# Patient Record
Sex: MALE | Race: WHITE
[De-identification: names, ages, dates, MRNs, and addresses within clinical notes are randomized per-mention and may not be internally consistent; named-entity substitution may affect disease eponyms.]

---

## 2018-04-23 LAB
ALBUMIN SERPL-MCNC: 3.7 GM/DL (ref 3.4–5)
BASOPHILS # BLD: 0.03 K/UL (ref 0–0.2)
BASOPHILS NFR BLD: 0.4 %
BUN SERPL-MCNC: 20 MG/DL (ref 7–18)
CALCIUM SERPL-MCNC: 9.1 MG/DL (ref 8.5–10.1)
CO2 SERPL-SCNC: 29 MMOL/L (ref 21–32)
CREAT SERPL-MCNC: 0.98 MG/DL (ref 0.6–1.4)
EOS ABS #: 0.24 K/UL (ref 0–0.5)
EOSINOPHIL NFR BLD AUTO: 213 K/UL (ref 130–400)
GLUCOSE SERPL-MCNC: 152 MG/DL (ref 70–99)
HBA1C MFR BLD: 7 % (ref 4.5–5.6)
HCT VFR BLD CALC: 45.1 % (ref 42–52)
HGB BLD-MCNC: 14.9 G/DL (ref 14–18)
IG#: 0.02 K/UL (ref 0–0.02)
IMM GRANULOCYTES NFR BLD AUTO: 28.3 %
INR PPP: 1 (ref 0.9–1.1)
LYMPHOCYTES # BLD: 2.32 K/UL (ref 1.2–3.4)
MCH RBC QN AUTO: 29.2 PG (ref 25–34)
MCHC RBC AUTO-ENTMCNC: 33 G/DL (ref 32–36)
MCV RBC AUTO: 88.3 FL (ref 80–100)
MONO ABS #: 0.44 K/UL (ref 0.11–0.59)
MONOCYTES NFR BLD: 5.4 %
NEUT ABS #: 5.16 K/UL (ref 1.4–6.5)
NEUTROPHILS # BLD AUTO: 2.9 %
NEUTROPHILS NFR BLD AUTO: 62.8 %
PMV BLD AUTO: 9.9 FL (ref 7.4–10.4)
POTASSIUM SERPL-SCNC: 4.5 MMOL/L (ref 3.5–5.1)
PTT PATIENT: 25.7 SECONDS (ref 21–31)
RED CELL DISTRIBUTION WIDTH CV: 13.7 % (ref 11.5–14.5)
RED CELL DISTRIBUTION WIDTH SD: 44.4 FL (ref 36.4–46.3)
SODIUM SERPL-SCNC: 135 MMOL/L (ref 136–145)
WBC # BLD AUTO: 8.21 K/UL (ref 4.8–10.8)

## 2018-04-23 NOTE — DIAGNOSTIC IMAGING REPORT
CHEST 2 VIEWS ROUTINE



CLINICAL HISTORY: Preoperative chest    



COMPARISON STUDY:  No previous studies for comparison.



FINDINGS: The heart is enlarged. There is no failure. There is no focal

pulmonary consolidation. There are no pleural effusions.[ 



IMPRESSION: Cardiomegaly. No acute findings.







Electronically signed by:  Sunil Gaspar M.D.

4/23/2018 10:21 AM



Dictated Date/Time:  4/23/2018 10:20 AM

## 2018-05-01 NOTE — HISTORY & PHYSICAL EXAMINATION
DATE OF ADMISSION:  05/02/2018

 

CHIEF COMPLAINT:  Chronic right knee pain.

 

HISTORY OF PRESENT ILLNESS:  This is a 47-year-old male patient of Dr. Bethea's complaining of chronic right knee pain, longstanding, now

progressively getting worse.  Patient failed conservative treatment including

anti-inflammatories, intraarticular injections and the use of a sleeve. 

Patient has increased pain with weightbearing activities and his pain does

interfere with his activities of daily living.  Patient has been diagnosed

with end-stage osteoarthritis per clinical and radiographic exams.

 

PAST MEDICAL HISTORY:  Snoring, carpal tunnel, diabetes, rheumatoid

arthritis, osteoarthritis, spine problems, acid reflux, obesity, dentures.

 

SOCIAL HISTORY:  Nonsmoker, occasional drinker.

 

PAST SURGICAL HISTORY:  Eye surgery.

 

FAMILY HISTORY:  Noncontributory.

 

REVIEW OF SYSTEMS:  Patient complains of chronic right knee pain, otherwise

denies any shortness of breath, chest pain, nausea, vomiting or any other

joint complaints.

 

MEDICATIONS:  Include:

1.  Prilosec 40 mg daily.

2.  Glucosamine chondroitin daily.

3.  Metformin 500 mg b.i.d.

4.  Diclofenac sodium 75 mg b.i.d.

 

ALLERGIES:  No known drug allergies.

 

PHYSICAL EXAMINATION:

GENERAL:  Well-developed, well-nourished 47-year-old male in no acute

distress.  He is alert and oriented x3 and pleasant.

HEENT:  Normocephalic, atraumatic.  Extraocular motions are intact.  Pupils

are equal and reactive to light.

HEART:  Regular rate and rhythm, no murmurs.

LUNGS:  Clear.

ABDOMEN:  Soft and nontender.  Bowel sounds are present.

EXTREMITIES:  Right knee reveals 0-135 degrees of motion with a varus

deformity.  He has medial joint line tenderness with mild effusion.  He has

5/5 strength.

NEUROLOGIC:  Neurovascularly, he is intact in his right lower extremity.

 

DIAGNOSES:  Right knee end-stage osteoarthritis, snoring, diabetes mellitus,

rheumatoid arthritis, osteoarthritis, spine problems, acid reflux, obesity,

dentures.

 

PLAN:  Patient was advised of his diagnosis.  Indications, risks, benefits,

postop course have been reviewed.  Patient wished to proceed with a right

total knee arthroplasty.  Necessary consent forms, preoperative testing and

clearances will be obtained.

## 2018-05-02 ENCOUNTER — HOSPITAL ENCOUNTER (INPATIENT)
Dept: HOSPITAL 45 - C.ACU | Age: 48
LOS: 2 days | Discharge: HOME | DRG: 470 | End: 2018-05-04
Attending: ORTHOPAEDIC SURGERY | Admitting: ORTHOPAEDIC SURGERY
Payer: COMMERCIAL

## 2018-05-02 VITALS
TEMPERATURE: 97.7 F | SYSTOLIC BLOOD PRESSURE: 129 MMHG | OXYGEN SATURATION: 96 % | HEART RATE: 88 BPM | DIASTOLIC BLOOD PRESSURE: 86 MMHG

## 2018-05-02 VITALS
DIASTOLIC BLOOD PRESSURE: 80 MMHG | OXYGEN SATURATION: 97 % | SYSTOLIC BLOOD PRESSURE: 136 MMHG | TEMPERATURE: 98.42 F | HEART RATE: 79 BPM

## 2018-05-02 VITALS
SYSTOLIC BLOOD PRESSURE: 139 MMHG | TEMPERATURE: 98.24 F | HEART RATE: 87 BPM | OXYGEN SATURATION: 96 % | DIASTOLIC BLOOD PRESSURE: 78 MMHG

## 2018-05-02 VITALS
SYSTOLIC BLOOD PRESSURE: 157 MMHG | TEMPERATURE: 97.88 F | OXYGEN SATURATION: 97 % | DIASTOLIC BLOOD PRESSURE: 92 MMHG | HEART RATE: 77 BPM

## 2018-05-02 VITALS
HEART RATE: 79 BPM | TEMPERATURE: 98.24 F | DIASTOLIC BLOOD PRESSURE: 93 MMHG | OXYGEN SATURATION: 98 % | SYSTOLIC BLOOD PRESSURE: 157 MMHG

## 2018-05-02 VITALS
TEMPERATURE: 98.24 F | SYSTOLIC BLOOD PRESSURE: 166 MMHG | HEART RATE: 79 BPM | OXYGEN SATURATION: 96 % | DIASTOLIC BLOOD PRESSURE: 96 MMHG

## 2018-05-02 VITALS
HEIGHT: 71 IN | WEIGHT: 315 LBS | BODY MASS INDEX: 44.1 KG/M2 | HEIGHT: 71 IN | WEIGHT: 315 LBS | BODY MASS INDEX: 44.1 KG/M2

## 2018-05-02 DIAGNOSIS — K21.9: ICD-10-CM

## 2018-05-02 DIAGNOSIS — M17.11: Primary | ICD-10-CM

## 2018-05-02 DIAGNOSIS — Z79.84: ICD-10-CM

## 2018-05-02 DIAGNOSIS — Z79.899: ICD-10-CM

## 2018-05-02 DIAGNOSIS — E11.9: ICD-10-CM

## 2018-05-02 DIAGNOSIS — E66.01: ICD-10-CM

## 2018-05-02 PROCEDURE — 0SRC0J9 REPLACEMENT OF RIGHT KNEE JOINT WITH SYNTHETIC SUBSTITUTE, CEMENTED, OPEN APPROACH: ICD-10-PCS | Performed by: ORTHOPAEDIC SURGERY

## 2018-05-02 RX ADMIN — INSULIN ASPART SCH UNITS: 100 INJECTION, SOLUTION INTRAVENOUS; SUBCUTANEOUS at 21:41

## 2018-05-02 RX ADMIN — ACETAMINOPHEN SCH MG: 500 TABLET, COATED ORAL at 21:35

## 2018-05-02 RX ADMIN — TRANEXAMIC ACID SCH MLS/HR: 100 INJECTION, SOLUTION INTRAVENOUS at 06:30

## 2018-05-02 RX ADMIN — CEFAZOLIN SCH MLS/MIN: 10 INJECTION, POWDER, FOR SOLUTION INTRAVENOUS at 21:38

## 2018-05-02 RX ADMIN — INSULIN ASPART SCH UNITS: 100 INJECTION, SOLUTION INTRAVENOUS; SUBCUTANEOUS at 18:39

## 2018-05-02 RX ADMIN — INSULIN GLARGINE SCH UNITS: 100 INJECTION, SOLUTION SUBCUTANEOUS at 21:40

## 2018-05-02 RX ADMIN — OXYCODONE HYDROCHLORIDE PRN MG: 5 TABLET ORAL at 20:20

## 2018-05-02 RX ADMIN — TRANEXAMIC ACID SCH MLS/HR: 100 INJECTION, SOLUTION INTRAVENOUS at 13:07

## 2018-05-02 RX ADMIN — CELECOXIB SCH MG: 200 CAPSULE ORAL at 21:36

## 2018-05-02 RX ADMIN — SODIUM CHLORIDE SCH MLS/HR: 900 INJECTION, SOLUTION INTRAVENOUS at 17:30

## 2018-05-02 RX ADMIN — DOCUSATE SODIUM SCH MG: 100 CAPSULE, LIQUID FILLED ORAL at 21:35

## 2018-05-02 NOTE — MEDICAL CONSULT
Consultation


Date of Consultation:


May 2, 2018.


Attending Physician:


Roosevelt Bethea M.D.


Reason for Consultation:


Medical management


History of Present Illness


Pt is 48 y/o M with PMH DM II, GERD seen in medical management after R TKA 

today by Dr Bethea. Pt states pain is well controlled. He still has some 

numbness sensation to R lower leg/foot. Pt states has not urinated or had BM 

yet since surgery. Eating and drinking well. Denies fever/chills, diaphoresis, N

/V/D/C, HA, dizziness, syncope, vision changes, neck pain, CP, SOB, orthopnea, 

palpitations, cough, sore throat, choking, otalgia, rhinorrhea, abdominal pain, 

rashes, urinary symptoms, weight loss.





Past Medical/Surgical History


Medical Problems:


(1) Dislocation of right shoulder joint


Status: Resolved  





(2) DM type 2 (diabetes mellitus, type 2)


Status: Chronic  





(3) GERD (gastroesophageal reflux disease)


Status: Chronic  





(4) Right knee DJD


Status: Chronic  





(5) Right shoulder strain


Status: Resolved  





Surgical Problems:


(1) H/O eye surgery


Permanent Comment: L eye s/p eye injury


Status: Resolved  





(2) History of repair of anterior cruciate ligament of right knee


Status: Resolved  








Family History





Cancer


Diabetes mellitus


Hypertension





Social History


Smoking Status:  Never Smoker


Smokeless Tobacco Use:  No


Alcohol Use:  socially


Drug Use:  none


Marital Status:  


Housing Status:  lives with family


Occupation Status:  employed





Allergies


Coded Allergies:  


     No Known Allergies (Unverified , 5/2/18)





Home Medications





Reported Home Medications








 Medications  Dose


 Route/Sig


 Max Daily Dose Days Date Category Dose


Instructions


 


 Flexeril


  (Cyclobenzaprine


 Hcl) 10 Mg Tab  10 Mg


 PO TID PRN


    4/23/18 Reported 


 


 Glucosamine


 Chondroitin


  (Glucosamine-Chondroitin-Vit


 C-) Unknown


 Strength Cap  Unknown Dose


 PO BID


    4/23/18 Reported  PT RECEIVED INSTRUCTIONS FROM SURGEON OFFICE TO STOP 1 

WEEK PRIOR TO


 SURGERY - LAST DOSE WAS AM OF 04/22/18


 


 Voltaren


  (Diclofenac


 Sodium) 75 Mg


 Tabcr  75 Mg


 PO BID


    4/23/18 Reported  PT RECEIVED INSTRUCTIONS FROM SURGEON OFFICE TO STOP 1 

WEEK PRIOR TO


 SURGERY - LAST DOSE 04/22/18


 


 Tylenol


  (Acetaminophen)


 500 Mg Tab  2 Tab


 PO UD PRN


    4/23/18 Reported 


 


 Glucophage


  (Metformin Hcl)


 500 Mg Tab  500 Mg


 PO BID


    10/22/16 Reported 


 


 Motrin


  (Ibuprofen) 800


 Mg Tab  800 Mg


 PO Q6HR PRN PRN


    11/15/12 Reported  PT RECEIVED INSTRUCTIONS FROM SURGEON OFFICE TO STOP PRE 

OP 1 WEEK


 PRIOR TO SURGERY - PT


 REPORTS HE HAS STOPPED THIS...LAST DOSE OF THIS MED WAS 04/22/18


 


 Prilosec


  (Omeprazole) 40


 Mg Cap  40 Mg


 PO QAM


    11/15/12 Reported  take 1 hour prior first meal of the day











Current Inpatient Medications





Current Inpatient Medications








 Medications


  (Trade)  Dose


 Ordered  Sig/Nael


 Route  Start Time


 Stop Time Status Last Admin


Dose Admin


 


 Lactated Ringer's  1,000 ml @ 


 15 mls/hr  Q24H


 IV  5/2/18 06:00


 5/3/18 05:59   


 


 


 Cyclobenzaprine


 HCl


  (Flexeril Tab)  10 mg  TID  PRN


 PO  5/2/18 16:15


 6/1/18 16:14   


 


 


 Sodium Chloride  1,000 ml @ 


 100 mls/hr  Q10H


 IV  5/2/18 17:30


 5/3/18 17:29   


 


 


 Cefazolin Sodium


 3000 mg/Syringe  22.5 ml @ 


 4.5 mls/min  Q8H


 IV  5/2/18 21:00


 5/3/18 05:04   


 


 


 Celecoxib


  (CeleBREX CAP)  200 mg  BID


 PO  5/2/18 21:00


 6/1/18 20:59   


 


 


 Oxycodone HCl


  (Roxicodone


 Immediate Rel Tab)  1 TABLET


 FOR PAIN


 RATING...  Q4H  PRN


 PO  5/2/18 16:15


 5/16/18 16:14   


 


 


 Morphine Sulfate


  (MoRPHine


 SULFATE INJ)  FOR PAIN,


 2-4MG 2MG


 FOR P...  Q2H  PRN


 IV  5/2/18 16:15


 5/16/18 16:14   


 


 


 Acetaminophen


  (Tylenol Tab)  1,000 mg  Q8H


 PO  5/2/18 22:00


 6/1/18 21:59   


 


 


 Magnesium


 Hydroxide


  (Milk Of


 Magnesia Susp)  30 ml  Q6H  PRN


 PO  5/2/18 16:15


 6/1/18 16:14   


 


 


 Bisacodyl


  (Dulcolax Supp)  10 mg  DAILY  PRN


 OK  5/2/18 16:15


 6/1/18 16:14   


 


 


 Sodium


 Biphosphate/


 Sodium Phosphate


  (Fleet Enema)  132 ml  DAILY  PRN


 OK  5/2/18 16:15


 6/1/18 16:14   


 


 


 Docusate Sodium


  (coLACE CAP)  100 mg  BID


 PO  5/2/18 21:00


 6/1/18 20:59   


 


 


 Diphenhydramine


 HCl


  (Benadryl Cap)  25 mg  Q8H  PRN


 PO  5/2/18 16:15


 6/1/18 16:14   


 


 


 Zolpidem Tartrate


  (Ambien Tab)  5 mg  HSZ  PRN


 PO  5/2/18 16:15


 6/1/18 16:14   


 


 


 Multivitamins


  (Multivitamin


 Tab)  1 tab  QAM


 PO  5/3/18 09:00


 6/2/18 08:59   


 


 


 Ondansetron HCl


  (Zofran Inj)  4 mg  Q6H  PRN


 IV  5/2/18 16:15


 6/1/18 16:14   


 


 


 Metoclopramide HCl


  (Reglan Inj)  10 mg  Q6H  PRN


 IV  5/2/18 16:15


 6/1/18 16:14   


 


 


 Pantoprazole


 Sodium


  (Protonix Tab)  40 mg  QAM


 PO  5/3/18 09:00


 5/7/18 08:59   


 


 


 Tramadol HCl


  (Ultram Tab)  1 tablet


 for pain


 rating...  Q4H  PRN


 PO  5/2/18 16:15


 6/1/18 16:14   


 


 


 Rivaroxaban


  (Xarelto Tab)  10 mg  Q24H


 PO  5/3/18 06:00


 5/14/18 06:01   


 


 


 Insulin Aspart


  (novoLOG ASPART)  **SLIDING


 SCALE**


 **G...  ACHS


 SC  5/2/18 21:00


 6/1/18 20:59  5/2/18 18:39


6 UNITS


 


 Insulin Glargine


  (Lantus Solostar


 Pen)  12 units  Q12


 SC  5/2/18 21:00


 6/1/18 20:59 UNV  


 


 


 Insulin Aspart


  (novoLOG ASPART)  **SLIDING


 SCALE**


 **If C...  ACHS


 SC  5/2/18 21:00


 6/1/18 20:59 UNV  


 


 


 Glucose


  (Glucose 40% Gel)  15-30


 GRAMS 15


 GRAMS...  UD  PRN


 PO  5/2/18 18:30


 6/1/18 18:29 UNV  


 


 


 Glucose


  (Glucose Chew


 Tab)  4-8


 Tablets 4


 Tabl...  UD  PRN


 PO  5/2/18 18:30


 6/1/18 18:29 UNV  


 


 


 Dextrose


  (Dextrose 50%


 50ML Syringe)  25-50ML


 25ML FOR


 ...  UD  PRN


 IV  5/2/18 18:30


 6/1/18 18:29 UNV  


 


 


 Glucagon


  (Glucagon Inj)  1 mg  UD  PRN


 SQ  5/2/18 18:30


 6/1/18 18:29 UNV  


 


 


 Carbohydrates


  (Carbohydrates


 For Hypoglycemia)  15-30 GRAMS


 15 grams if


 BSG 54-69...  UD  PRN


 PO  5/2/18 18:30


 6/1/18 18:29 UNV  


 











Review of Systems


See HPI for pertinent positives & negatives. All other systems reviewed and 

were otherwise negative





Physical Exam











  Date Time  Temp Pulse Resp B/P (MAP) Pulse Ox O2 Delivery O2 Flow Rate FiO2


 


5/2/18 17:54 36.6 77 16 157/92 (113) 97 Nasal Cannula 2.0 


 


5/2/18 17:13      Nasal Cannula 4.0 


 


5/2/18 16:50 36.9 79 16 136/80 (98) 97 Nasal Cannula 2.0 


 


5/2/18 16:35 36.6 78 16 135/72 95 Nasal Cannula 2 


 


5/2/18 16:25  78 16 137/82 95 Oxymask 10 


 


5/2/18 16:15  70 16 146/76 94 Oxymask 10 


 


5/2/18 16:09 36.0 76 16 134/71 96 Oxymask 10 


 


5/2/18 10:04 36.8 79 19 166/96 96 Room Air  








General Appearance:  no apparent distress, + obese


Head:  normocephalic, atraumatic


Eyes:  sclerae normal


ENT:  hearing grossly normal, pharynx normal, + pertinent finding (mucous 

membranes moist)


Neck:  supple, trachea midline


Respiratory/Chest:  lungs clear, normal breath sounds, no respiratory distress


Cardiovascular:  regular rate, rhythm, no murmur, normal peripheral pulses


Abdomen/GI:  normal bowel sounds, non tender, soft


Extremities/Musculoskelatal:  + pertinent finding (R knee with surgical 

dressing in place. Distal pulses intact bilaterally. sensation to light touch 

intact, brisk capillary refill intact bilaterally. pedal pushes and pulls 

intact bilaterally.)


Neurologic/Psych:  alert, normal mood/affect, oriented x 3


Skin:  normal color, warm/dry





Laboratory Results





Last 24 Hours








Test


  5/2/18


09:41 5/2/18


16:17 5/2/18


17:05


 


Bedside Glucose 156 mg/dl  142 mg/dl  167 mg/dl 











Assessment & Plan


Pt post op day# 0 S/P R TKA by Dr Bethea


-pain management per ortho


-wound management per ortho


-PT/OT as appropriate 


-DVT prophylaxis per ortho


-incentive spirometry


-monitor H&H for acute blood loss anemia





DM II


HA1C: 7.0 on 4/23/28


-hold metformin


-Lantus, Novolog sliding scale per protocol





GERD


-continue PPI





DVT Prophylaxis


-per ortho





Disposition


admitted medsur


Full Code


Follows with Dr Dimitry Conteh for routine care





Pt was seen with Dr Coley. See addendum





ATTENDING ADDENDUM:





Patient seen and examined care coordinated with Samaria Mac PA-C





This is a 47-year-old male who underwent R knee total knee arthroplasty


Recovering well postop,


Has minimal pain


Able to ambulate independently


No complaint of shortness of breath, chest heaviness





Continue management as per orthopedics


Ordered for CBC in a.m. to assess for acute blood loss anemia





Trish Coley MD





Additional Copies To


Aj Moraes D.O.

## 2018-05-02 NOTE — [facility] OPERATIVE REPORT
Operative Report


Operative Date


May 2, 2018.





Pre-Operative Diagnosis





Right Knee End-Stage Osteoarthritis,h/o acl reconstruction morbid obesity


BMI 48.6





Post-Operative Diagnosis


Same failed ACL reconstruction





Procedure(s) Performed


Right total knee arthroplasty with lateral release





Surgeon


Dr. Roosevelt Bethea





Assistant Surgeon(s)


Darrin Yen PA-C





Estimated Blood Loss


15 ml





Findings


Severe DJD failed ACL reconstruction increased posterior slope tibia with 

posterior medial bone loss on tibia





Specimens





a. right knee bone and tissue





Drains


2 hemovac





Anesthesia


Spinal sedation regional block orthomix





Complication(s)


None





Disposition


Recovery Room / PACU





Indications


47-year-old male history of previous ACL reconstruction his right knee severe 

bilateral knee DJD.  Had extensive conservative management.  Radiographs 

demonstrate bone-on-bone medial compartment bilateral knees with varus knees 

increased posterior slope tibia with posterior medial bone loss tibia





Description of Procedure


Patient taken to the operating room the size under IV sedation spinal 

anesthetic adductor canal nerve block anesthesia.  Patient was placed supine on 

the operating table.  A pneumatic tourniquet was placed about the right upper 

thigh.  The right lower extremity was prepped and draped in sterile fashion.  

Knee exam demonstrated obese leg obese thigh flexion contracture of 15 with 

flexion to 115.  The leg was elevated exsanguinated with an Esmarch bandage 

and pneumatic tourniquet was raised to 350 millimeters of mercury.  Skin 

incised sharply in longitudinal fashion.  Subcutaneous flaps elevated.  

Incision was made through the medial retinaculum extending up in the mid third 

of the quadriceps tendon and down to the medial tibial tubercle.  Intra-

articular findings demonstrated severe osteoarthritis of the knee bone-on-bone 

medial compartment posterior medial bone loss advanced patellofemoral DJD 

tricompartmental osteophytes absent ACL graft degeneration PCL.  The [employer] 

[employer] primary total knee arthroplasty system was used.  To expose the 

knee the infrapatellar fat pad was resected.  The meniscal remnants and 

cruciate ligaments were resected.  The anterior fat pad over the femur in the 

area of the anterior flange of the femoral component was resected.  Lateral 

synovial bands release.  The femur was exposed.  An intramedullary drill hole 

was made into the canal.  A guidewire was placed.  Distal femoral cutting guide 

was adjusted to resect a 5 degree valgus cut with 2 more millimeters of distal 

femur than the primary cut resected.  The knee was extended and a subperiosteal 

peel lateral release was performed around the patella.  Patella width was 

measured and width was reproduced using a freehand cut technique and a 38 

patella component.  The 3 drill holes were made and the excess lateral facet 

was beveled off to prevent any impingement.  Attention was taken back to the 

femur which was exposed with retractors and the femoral sizing guide was pinned 

in position.  The drill holes were placed in 3 of external rotation to match 

epicondylar axis.  Femur sized for a 7 component.  The 4-in-1 cutting block was 

placed and then the anterior posterior and chamfer cuts are made.  The tibia 

was then subluxed.  The external tibial cutting guide was just to make a 

perpendicular cut to the long axis of the tibia below the most deficient bone 

loss side.  A lamina  was used and the flexion extension gaps were 

balanced.  All posterior osteophytes removed.  All meniscal remnants were 

resected.  The tibia exposed and the trial tibial component size 6 was 

externally rotated in line with the tibial tubercle and pinned in position.  I 

first drilled a hole for the central stem due to possibility of the screw from 

previous ACL reconstruction being in the way and the screw was either 

bioabsorbable or peak so we went ahead with the drill and had no difficulty 

drilling for the stem.  Then the punch for stem was used.  The 7 femoral trial 

was centered appropriately and the femoral notch cut was made.  The collet was 

placed.  Trial tibial inserts were placed and size 11 high flex gave balanced 

ligaments through flexion and extension.  Patella tracking was assessed.  The 

patella tracked slightly laterally with slight liftoff.  A lateral release was 

performed allowing the synovium to be maintained intact.  I reassessed 

stability of the patella and the patella tracked centrally.  The trial 

components were then removed and the orthomix anesthetic cocktail was injected 

per protocol.  The knee was then copiously irrigated with pulsatile lavage 

antibiotic solution.  Final components were then cemented with Simplex cement.  

Final components were [employer] 7 femoral component posterior 

stabilized right, primary tibial baseplate, posterior stabilized left 

millimeter high flex polyethylene.  While the cement cured the Betadine soak 

was used per protocol.  After cement cured further pulsatile lavage irrigation 

performed and 2 Hemovac drains were brought out laterally.  The quadriceps 

tendon and medial retinaculum were closed with figure of 8 #1 Vicryl sutures.  

The knee was taken through full range of motion and the repair was secure.  The 

subcutaneous tissues were closed with 2-0 Vicryl sutures.  Skin was closed with 

staples.  Sterile dressings were applied.  Patient procedure well.  Darrin CRAFT was my physician assistant who assisted in patient positioning prepping and 

draping,leg positioning ,soft tissue retraction and instrument management and 

participated in the closing and will participate in postoperative care of the 

patient.  There was an increased difficulty throughout the procedure due to 

size the patient the leg and morbid obesity BMI 48.6.  The patient tolerated 

the procedure well.


I attest to the content of the Intraoperative Record and any orders documented 

therein.  Any exceptions are noted below.

## 2018-05-02 NOTE — DIAGNOSTIC IMAGING REPORT
R KNEE 1 OR 2 VIEWS ROUTINE



HISTORY:  47 years-old Male AP/LATERAL IN PACU RIGHT KNEE right knee total joint

arthroplasty. Degenerative joint disease.



COMPARISON: None available



TECHNIQUE: 2 views of the right knee



FINDINGS: 

Postoperative changes from recent right knee total joint arthroplasty with

patellar resurfacing. Surgical drain is in place along with anterior skin

staples and expected postsurgical soft tissue swelling with deep tissue air.

Evidence of suggested prior ACL repair. No malalignment, periprosthetic fracture

or retained foreign body identified.



IMPRESSION: Right knee arthroplasty and patella resurfacing without complication

identified. 







The above report was generated using voice recognition software. It may contain

grammatical, syntax or spelling errors.







Electronically signed by:  Sav Monaco M.D.

5/2/2018 4:53 PM



Dictated Date/Time:  5/2/2018 4:52 PM

## 2018-05-02 NOTE — [facility] POST OPERATIVE BRIEF NOTE
Immediate Operative Summary


Operative Date


May 2, 2018.





Pre-Operative Diagnosis





Right Knee End-Stage Osteoarthritis,h/o acl reconstruction





Post-Operative Diagnosis





Right Knee End-Stage Osteoarthritis





Procedure(s) Performed





Right Total Knee Arthroplasty,lateral release,increased difficulty BMI 48.6





Surgeon


Dr. Roosevelt Bethea





Assistant Surgeon(s)


Darrin Yen PA-C





Estimated Blood Loss


15 ml





Findings


Consistent with Post-Op Diagnosis





Specimens





a. right knee bone and tissue





Drains


2 hemovac





Anesthesia Type


MAC Spinal Regional





Complication(s)


none





Disposition


Disposition:  Recovery Room / PACU

## 2018-05-02 NOTE — ANESTHESIOLOGY PROGRESS NOTE
Anesthesia Post Op Note


Date & Time


May 2, 2018 at 16:30





Vital Signs


Pain Intensity:  0





Vital Signs Past 12 Hours








  Date Time  Temp Pulse Resp B/P (MAP) Pulse Ox O2 Delivery O2 Flow Rate FiO2


 


5/2/18 16:25  78 16 137/82 95 Oxymask 10 


 


5/2/18 16:15  70 16 146/76 94 Oxymask 10 


 


5/2/18 16:09 36.0 76 16 134/71 96 Oxymask 10 


 


5/2/18 10:04 36.8 79 19 166/96 96 Room Air  











Notes


Mental Status:  alert / awake / arousable, participated in evaluation


Pt Amnestic to Procedure:  Yes


Nausea / Vomiting:  adequately controlled


Pain:  adequately controlled


Airway Patency, RR, SpO2:  stable & adequate


BP & HR:  stable & adequate


Hydration State:  stable & adequate


Anesthetic Complications:  no major complications apparent

## 2018-05-03 VITALS
TEMPERATURE: 98.6 F | OXYGEN SATURATION: 95 % | DIASTOLIC BLOOD PRESSURE: 88 MMHG | HEART RATE: 77 BPM | SYSTOLIC BLOOD PRESSURE: 138 MMHG

## 2018-05-03 VITALS
HEART RATE: 74 BPM | SYSTOLIC BLOOD PRESSURE: 125 MMHG | OXYGEN SATURATION: 94 % | DIASTOLIC BLOOD PRESSURE: 77 MMHG | TEMPERATURE: 97.7 F

## 2018-05-03 VITALS
SYSTOLIC BLOOD PRESSURE: 148 MMHG | HEART RATE: 73 BPM | DIASTOLIC BLOOD PRESSURE: 78 MMHG | OXYGEN SATURATION: 94 % | TEMPERATURE: 98.42 F

## 2018-05-03 VITALS — OXYGEN SATURATION: 94 %

## 2018-05-03 VITALS
DIASTOLIC BLOOD PRESSURE: 88 MMHG | TEMPERATURE: 98.06 F | HEART RATE: 85 BPM | SYSTOLIC BLOOD PRESSURE: 149 MMHG | OXYGEN SATURATION: 96 %

## 2018-05-03 VITALS
SYSTOLIC BLOOD PRESSURE: 147 MMHG | HEART RATE: 74 BPM | DIASTOLIC BLOOD PRESSURE: 80 MMHG | TEMPERATURE: 98.06 F | OXYGEN SATURATION: 95 %

## 2018-05-03 LAB
BUN SERPL-MCNC: 17 MG/DL (ref 7–18)
CALCIUM SERPL-MCNC: 8.3 MG/DL (ref 8.5–10.1)
CO2 SERPL-SCNC: 26 MMOL/L (ref 21–32)
CREAT SERPL-MCNC: 0.97 MG/DL (ref 0.6–1.4)
EOSINOPHIL NFR BLD AUTO: 222 K/UL (ref 130–400)
GLUCOSE SERPL-MCNC: 132 MG/DL (ref 70–99)
HCT VFR BLD CALC: 39.6 % (ref 42–52)
HGB BLD-MCNC: 13.3 G/DL (ref 14–18)
MCH RBC QN AUTO: 29.2 PG (ref 25–34)
MCHC RBC AUTO-ENTMCNC: 33.6 G/DL (ref 32–36)
MCV RBC AUTO: 87 FL (ref 80–100)
PMV BLD AUTO: 10.2 FL (ref 7.4–10.4)
POTASSIUM SERPL-SCNC: 4.1 MMOL/L (ref 3.5–5.1)
RED CELL DISTRIBUTION WIDTH CV: 13.4 % (ref 11.5–14.5)
RED CELL DISTRIBUTION WIDTH SD: 42.7 FL (ref 36.4–46.3)
SODIUM SERPL-SCNC: 134 MMOL/L (ref 136–145)
WBC # BLD AUTO: 13.28 K/UL (ref 4.8–10.8)

## 2018-05-03 RX ADMIN — INSULIN ASPART SCH UNITS: 100 INJECTION, SOLUTION INTRAVENOUS; SUBCUTANEOUS at 18:12

## 2018-05-03 RX ADMIN — DOCUSATE SODIUM SCH MG: 100 CAPSULE, LIQUID FILLED ORAL at 08:47

## 2018-05-03 RX ADMIN — OXYCODONE HYDROCHLORIDE PRN MG: 5 TABLET ORAL at 19:55

## 2018-05-03 RX ADMIN — OXYCODONE HYDROCHLORIDE PRN MG: 5 TABLET ORAL at 00:32

## 2018-05-03 RX ADMIN — OXYCODONE HYDROCHLORIDE PRN MG: 5 TABLET ORAL at 05:14

## 2018-05-03 RX ADMIN — OXYCODONE HYDROCHLORIDE PRN MG: 5 TABLET ORAL at 14:49

## 2018-05-03 RX ADMIN — TRAMADOL HYDROCHLORIDE PRN MG: 50 TABLET, COATED ORAL at 23:17

## 2018-05-03 RX ADMIN — CELECOXIB SCH MG: 200 CAPSULE ORAL at 21:00

## 2018-05-03 RX ADMIN — ACETAMINOPHEN SCH MG: 500 TABLET, COATED ORAL at 05:15

## 2018-05-03 RX ADMIN — MORPHINE SULFATE PRN MG: 4 INJECTION, SOLUTION INTRAMUSCULAR; INTRAVENOUS at 06:24

## 2018-05-03 RX ADMIN — OXYCODONE HYDROCHLORIDE PRN MG: 5 TABLET ORAL at 10:23

## 2018-05-03 RX ADMIN — ACETAMINOPHEN SCH MG: 500 TABLET, COATED ORAL at 22:25

## 2018-05-03 RX ADMIN — INSULIN ASPART SCH UNITS: 100 INJECTION, SOLUTION INTRAVENOUS; SUBCUTANEOUS at 13:12

## 2018-05-03 RX ADMIN — DOCUSATE SODIUM SCH MG: 100 CAPSULE, LIQUID FILLED ORAL at 21:00

## 2018-05-03 RX ADMIN — INSULIN ASPART SCH UNITS: 100 INJECTION, SOLUTION INTRAVENOUS; SUBCUTANEOUS at 08:52

## 2018-05-03 RX ADMIN — ACETAMINOPHEN SCH MG: 500 TABLET, COATED ORAL at 13:14

## 2018-05-03 RX ADMIN — INSULIN ASPART SCH UNITS: 100 INJECTION, SOLUTION INTRAVENOUS; SUBCUTANEOUS at 21:03

## 2018-05-03 RX ADMIN — INSULIN GLARGINE SCH UNITS: 100 INJECTION, SOLUTION SUBCUTANEOUS at 08:53

## 2018-05-03 RX ADMIN — CELECOXIB SCH MG: 200 CAPSULE ORAL at 08:47

## 2018-05-03 RX ADMIN — RIVAROXABAN SCH MG: 10 TABLET, FILM COATED ORAL at 05:14

## 2018-05-03 RX ADMIN — SODIUM CHLORIDE SCH MLS/HR: 900 INJECTION, SOLUTION INTRAVENOUS at 00:34

## 2018-05-03 RX ADMIN — Medication SCH TAB: at 08:47

## 2018-05-03 RX ADMIN — INSULIN GLARGINE SCH UNITS: 100 INJECTION, SOLUTION SUBCUTANEOUS at 21:01

## 2018-05-03 RX ADMIN — MORPHINE SULFATE PRN MG: 4 INJECTION, SOLUTION INTRAMUSCULAR; INTRAVENOUS at 17:20

## 2018-05-03 RX ADMIN — CEFAZOLIN SCH MLS/MIN: 10 INJECTION, POWDER, FOR SOLUTION INTRAVENOUS at 05:14

## 2018-05-03 RX ADMIN — SODIUM CHLORIDE SCH MLS/HR: 900 INJECTION, SOLUTION INTRAVENOUS at 10:16

## 2018-05-03 RX ADMIN — PANTOPRAZOLE SCH MG: 40 TABLET, DELAYED RELEASE ORAL at 08:47

## 2018-05-03 NOTE — PROGRESS NOTE
Medicine Progress Note


Date & Time of Visit:


May 3, 2018 at 1100.


Subjective


47-year-old male presents for elective right total knee replacement by Dr. Bethea.  He is doing well today.  He reports some discomfort under the bandage 

which he thinks is a staple in his incision site, but this is not bothering him 

too much.  He has no issues with food and is ambulating.





Objective





Last 8 Hrs








  Date Time  Temp Pulse Resp B/P (MAP) Pulse Ox O2 Delivery O2 Flow Rate FiO2


 


5/3/18 15:15      Room Air  


 


5/3/18 15:00 36.7 74 17 147/80 (102) 95 Room Air  


 


5/3/18 12:12 37.0 77 21 138/88 (105) 95 Room Air  








Physical Exam:


GEN: WNWD, in no acute distress, alert and appropriate


HEENT: NC/AT, normal sclerae


CARDIO: reg rate, S1/2 heard without m/g/r


LUNGS: CTA bilaterally, no crackles, rales or wheezes, good diaphragmatic 

excursion


ABD: soft, non-tender, non-distended, no rebound or guarding


EXTREMITY: warm and well perfused, RLE wrapped in ACE bandage, foot is warm and 

sensation intact. limited motion with ACE wrap in place.  Drain in place.


NEURO: CN 2-12 grossly intact


SKIN:  warm and dry, incision site is dressed with dressing that is c/d/i


Laboratory Results:


5/3/18 05:31








5/3/18 05:31

















Test


  4/23/18


09:46 5/3/18


05:31 5/3/18


17:24


 


Immature Granulocyte % (Auto) 0.2 %   


 


White Blood Count


  8.21 K/uL


(4.8-10.8) 


  


 


 


Red Blood Count


  5.11 M/uL


(4.7-6.1) 4.55 M/uL


(4.7-6.1) 


 


 


Hemoglobin


  14.9 g/dL


(14.0-18.0) 


  


 


 


Hematocrit 45.1 % (42-52)   


 


Mean Corpuscular Volume


  88.3 fL


() 87.0 fL


() 


 


 


Mean Corpuscular Hemoglobin


  29.2 pg


(25-34) 29.2 pg


(25-34) 


 


 


Mean Corpuscular Hemoglobin


Concent 33.0 g/dl


(32-36) 33.6 g/dl


(32-36) 


 


 


Platelet Count


  213 K/uL


(130-400) 


  


 


 


Mean Platelet Volume


  9.9 fL


(7.4-10.4) 10.2 fL


(7.4-10.4) 


 


 


Neutrophils (%) (Auto) 62.8 %   


 


Lymphocytes (%) (Auto) 28.3 %   


 


Monocytes (%) (Auto) 5.4 %   


 


Eosinophils (%) (Auto) 2.9 %   


 


Basophils (%) (Auto) 0.4 %   


 


Neutrophils # (Auto)


  5.16 K/uL


(1.4-6.5) 


  


 


 


Lymphocytes # (Auto)


  2.32 K/uL


(1.2-3.4) 


  


 


 


Monocytes # (Auto)


  0.44 K/uL


(0.11-0.59) 


  


 


 


Eosinophils # (Auto)


  0.24 K/uL


(0-0.5) 


  


 


 


Basophils # (Auto)


  0.03 K/uL


(0-0.2) 


  


 


 


Immature Granulocyte # (Auto)


  0.02 K/uL


(0.00-0.02) 


  


 


 


Prothrombin Time


  10.2 SECONDS


(9.0-12.0) 


  


 


 


Prothromb Time International


Ratio 1.0 (0.9-1.1) 


  


  


 


 


Activated Partial


Thromboplast Time 25.7 SECONDS


(21.0-31.0) 


  


 


 


Partial Thromboplastin Ratio 1.0   


 


Urine Color YELLOW   


 


Urine Appearance CLEAR (CLEAR)   


 


Urine pH 5.5 (4.5-7.5)   


 


Urine Specific Gravity


  1.029


(1.000-1.030) 


  


 


 


Urine Protein NEG (NEG)   


 


Urine Glucose (UA) 1+ (NEG)   


 


Urine Ketones NEG (NEG)   


 


Urine Occult Blood NEG (NEG)   


 


Urine Nitrite NEG (NEG)   


 


Urine Bilirubin NEG (NEG)   


 


Urine Urobilinogen NEG (NEG)   


 


Urine Leukocyte Esterase NEG (NEG)   


 


Estimated Average Glucose 154 mg/dl   


 


Hemoglobin A1c


  7.0 %


(4.5-5.6) 


  


 


 


Albumin


  3.7 gm/dl


(3.4-5.0) 


  


 


 


RDW Standard Deviation


  


  42.7 fL


(36.4-46.3) 


 


 


RDW Coefficient of Variation


  


  13.4 %


(11.5-14.5) 


 


 


Anion Gap


  


  6.0 mmol/L


(3-11) 


 


 


Est Creatinine Clear Calc


Drug Dose 


  144.3 ml/min 


  


 


 


Estimated GFR (


American) 


  107.3 


  


 


 


Estimated GFR (Non-


American 


  92.6 


  


 


 


BUN/Creatinine Ratio  17.6 (10-20)  


 


Calcium Level


  


  8.3 mg/dl


(8.5-10.1) 


 


 


Bedside Glucose


  


  


  114 mg/dl


(70-99)








Last 24 Hours








Test


  5/2/18


17:05 5/2/18


20:42 5/3/18


05:31 5/3/18


08:04


 


Bedside Glucose 167 mg/dl  239 mg/dl   124 mg/dl 


 


White Blood Count   13.28 K/uL  


 


Red Blood Count   4.55 M/uL  


 


Hemoglobin   13.3 g/dL  


 


Hematocrit   39.6 %  


 


Mean Corpuscular Volume   87.0 fL  


 


Mean Corpuscular Hemoglobin   29.2 pg  


 


Mean Corpuscular Hemoglobin


Concent 


  


  33.6 g/dl 


  


 


 


RDW Standard Deviation   42.7 fL  


 


RDW Coefficient of Variation   13.4 %  


 


Platelet Count   222 K/uL  


 


Mean Platelet Volume   10.2 fL  


 


Sodium Level   134 mmol/L  


 


Potassium Level   4.1 mmol/L  


 


Chloride Level   102 mmol/L  


 


Carbon Dioxide Level   26 mmol/L  


 


Anion Gap   6.0 mmol/L  


 


Blood Urea Nitrogen   17 mg/dl  


 


Creatinine   0.97 mg/dl  


 


Est Creatinine Clear Calc


Drug Dose 


  


  144.3 ml/min 


  


 


 


Estimated GFR (


American) 


  


  107.3 


  


 


 


Estimated GFR (Non-


American 


  


  92.6 


  


 


 


BUN/Creatinine Ratio   17.6  


 


Random Glucose   132 mg/dl  


 


Calcium Level   8.3 mg/dl  


 


Test


  5/3/18


12:05 


  


  


 


 


Bedside Glucose 152 mg/dl    











Assessment & Plan


47-year-old male presents for elective right total knee replacement by Dr. Bethea.  He is doing well today.  He reports some discomfort under the bandage 

which he thinks is a staple in his incision site, but this is not bothering him 

too much.  He has no issues with food and is ambulating.





1.  Postoperative state-postop day 1, pain management per Orth O, wound 

management per orthopedics, PT/OT as appropriate, encourage incentive 

spirometry.


2.  Diabetes type 2-continue Lantus and NovoLog sliding scale per protocol, 

currently controlled.


3.  GERD-continue PPI





DVT prophylaxis-rivaroxaban


Full code


Dispo-likely to home in next 1-2 days





Thank you for this consultation.  We will follow this patient during the 

hospitalization.





Ava Mcqueen DO


[facility]


Current Inpatient Medications:





Current Inpatient Medications








 Medications


  (Trade)  Dose


 Ordered  Sig/Nael


 Route  Start Time


 Stop Time Status Last Admin


Dose Admin


 


 Cyclobenzaprine


 HCl


  (Flexeril Tab)  10 mg  TID  PRN


 PO  5/2/18 16:15


 6/1/18 16:14   


 


 


 Sodium Chloride  1,000 ml @ 


 100 mls/hr  Q10H


 IV  5/2/18 17:30


 5/3/18 17:29  5/3/18 10:16


100 MLS/HR


 


 Celecoxib


  (CeleBREX CAP)  200 mg  BID


 PO  5/2/18 21:00


 6/1/18 20:59  5/3/18 08:47


200 MG


 


 Oxycodone HCl


  (Roxicodone


 Immediate Rel Tab)  1 TABLET


 FOR PAIN


 RATING...  Q4H  PRN


 PO  5/2/18 16:15


 5/16/18 16:14  5/3/18 14:49


10 MG


 


 Morphine Sulfate


  (MoRPHine


 SULFATE INJ)  FOR PAIN,


 2-4MG 2MG


 FOR P...  Q2H  PRN


 IV  5/2/18 16:15


 5/16/18 16:14  5/3/18 06:24


2 MG


 


 Acetaminophen


  (Tylenol Tab)  1,000 mg  Q8H


 PO  5/2/18 22:00


 6/1/18 21:59  5/3/18 13:14


1,000 MG


 


 Magnesium


 Hydroxide


  (Milk Of


 Magnesia Susp)  30 ml  Q6H  PRN


 PO  5/2/18 16:15


 6/1/18 16:14   


 


 


 Bisacodyl


  (Dulcolax Supp)  10 mg  DAILY  PRN


 VT  5/2/18 16:15


 6/1/18 16:14   


 


 


 Sodium


 Biphosphate/


 Sodium Phosphate


  (Fleet Enema)  132 ml  DAILY  PRN


 VT  5/2/18 16:15


 6/1/18 16:14   


 


 


 Docusate Sodium


  (coLACE CAP)  100 mg  BID


 PO  5/2/18 21:00


 6/1/18 20:59  5/3/18 08:47


100 MG


 


 Diphenhydramine


 HCl


  (Benadryl Cap)  25 mg  Q8H  PRN


 PO  5/2/18 16:15


 6/1/18 16:14   


 


 


 Zolpidem Tartrate


  (Ambien Tab)  5 mg  HSZ  PRN


 PO  5/2/18 16:15


 6/1/18 16:14   


 


 


 Multivitamins


  (Multivitamin


 Tab)  1 tab  QAM


 PO  5/3/18 09:00


 6/2/18 08:59  5/3/18 08:47


1 TAB


 


 Ondansetron HCl


  (Zofran Inj)  4 mg  Q6H  PRN


 IV  5/2/18 16:15


 6/1/18 16:14   


 


 


 Metoclopramide HCl


  (Reglan Inj)  10 mg  Q6H  PRN


 IV  5/2/18 16:15


 6/1/18 16:14   


 


 


 Pantoprazole


 Sodium


  (Protonix Tab)  40 mg  QAM


 PO  5/3/18 09:00


 5/7/18 08:59  5/3/18 08:47


40 MG


 


 Tramadol HCl


  (Ultram Tab)  1 tablet


 for pain


 rating...  Q4H  PRN


 PO  5/2/18 16:15


 6/1/18 16:14   


 


 


 Rivaroxaban


  (Xarelto Tab)  10 mg  Q24H


 PO  5/3/18 06:00


 5/14/18 06:01  5/3/18 05:14


10 MG


 


 Insulin Aspart


  (novoLOG ASPART)  **SLIDING


 SCALE**


 **G...  ACHS


 SC  5/2/18 21:00


 6/1/18 20:59  5/3/18 13:12


7 UNITS


 


 Insulin Glargine


  (Lantus Solostar


 Pen)  12 units  Q12


 SC  5/2/18 21:00


 6/1/18 20:59  5/3/18 08:53


12 UNITS


 


 Glucose


  (Glucose 40% Gel)  15-30


 GRAMS 15


 GRAMS...  UD  PRN


 PO  5/2/18 18:30


 6/1/18 18:29   


 


 


 Glucose


  (Glucose Chew


 Tab)  4-8


 Tablets 4


 Tabl...  UD  PRN


 PO  5/2/18 18:30


 6/1/18 18:29   


 


 


 Dextrose


  (Dextrose 50%


 50ML Syringe)  25-50ML


 25ML FOR


 ...  UD  PRN


 IV  5/2/18 18:30


 6/1/18 18:29   


 


 


 Glucagon


  (Glucagon Inj)  1 mg  UD  PRN


 SQ  5/2/18 18:30


 6/1/18 18:29   


 


 


 Carbohydrates


  (Carbohydrates


 For Hypoglycemia)  15-30 GRAMS


 15 grams if


 BSG 54-69...  UD  PRN


 PO  5/2/18 18:30


 6/1/18 18:29

## 2018-05-03 NOTE — ORTHOPEDIC PROGRESS NOTE
Orthopedic Progress Note


Date of Service


May 3, 2018.





Subjective


Post OP Day:  1


Reports: feeling well, pain controlled w PO medications, Denies: complaints, 

chest pain, SOB, nausea / vomiting, light headedness, calf pain





Objective


calves soft nontender, N/V intact, capillary refill less than 2 sec., dressing C

/D/I, A&O x3, toes mobile











  Date Time  Temp Pulse Resp B/P (MAP) Pulse Ox O2 Delivery O2 Flow Rate FiO2


 


5/3/18 03:16 36.5 74 16 125/77 (93) 94 Room Air  


 


5/2/18 23:00      Room Air  


 


5/2/18 22:46 36.5 88 16 129/86 (100) 96 Room Air  


 


5/2/18 19:51 36.8 87 16 139/78 (98) 96 Room Air  


 


5/2/18 18:50 36.8 79 17 157/93 (114) 98 Nasal Cannula 2.0 


 


5/2/18 18:30      Nasal Cannula 2.0 


 


5/2/18 17:54 36.6 77 16 157/92 (113) 97 Nasal Cannula 2.0 


 


5/2/18 17:13      Nasal Cannula 4.0 


 


5/2/18 16:50 36.9 79 16 136/80 (98) 97 Nasal Cannula 2.0 


 


5/2/18 16:35 36.6 78 16 135/72 95 Nasal Cannula 2 


 


5/2/18 16:25  78 16 137/82 95 Oxymask 10 


 


5/2/18 16:15  70 16 146/76 94 Oxymask 10 


 


5/2/18 16:09 36.0 76 16 134/71 96 Oxymask 10 


 


5/2/18 10:04 36.8 79 19 166/96 96 Room Air  








Laboratory Results 24 Hours:











Test


  5/3/18


05:31


 


Hematocrit 39.6 % 


 


Hemoglobin 13.3 g/dL 











Assessment & Plan


Assessment:


POD #1, Right TKA


Plan:


PT/ OT


DVT proph- Xarelto


D/C planning- OPPT


As per medicine








Inhouse Planning


Pain Management:  Celebrex, Ultram, Morphine, PO Tylenol, Oxy IR


DVT Prophylaxis:  TEDs, SCDs, Xarelto





Discharge Planning


Discharge Planning:  home with oppt


Pain Management:  Celebrex, PO Tylenol, Oxy IR


DVT Prophylaxis:  TEDs, Xarelto


Therapy:  Physical Therapy, Occupational Therapy

## 2018-05-04 VITALS
DIASTOLIC BLOOD PRESSURE: 88 MMHG | OXYGEN SATURATION: 98 % | TEMPERATURE: 98.42 F | SYSTOLIC BLOOD PRESSURE: 137 MMHG | HEART RATE: 80 BPM

## 2018-05-04 VITALS
TEMPERATURE: 98.42 F | OXYGEN SATURATION: 98 % | HEART RATE: 80 BPM | SYSTOLIC BLOOD PRESSURE: 137 MMHG | DIASTOLIC BLOOD PRESSURE: 88 MMHG

## 2018-05-04 LAB
BUN SERPL-MCNC: 16 MG/DL (ref 7–18)
CALCIUM SERPL-MCNC: 8.3 MG/DL (ref 8.5–10.1)
CO2 SERPL-SCNC: 28 MMOL/L (ref 21–32)
CREAT SERPL-MCNC: 0.87 MG/DL (ref 0.6–1.4)
EOSINOPHIL NFR BLD AUTO: 195 K/UL (ref 130–400)
GLUCOSE SERPL-MCNC: 119 MG/DL (ref 70–99)
HCT VFR BLD CALC: 37.9 % (ref 42–52)
HGB BLD-MCNC: 12.7 G/DL (ref 14–18)
MCH RBC QN AUTO: 29.3 PG (ref 25–34)
MCHC RBC AUTO-ENTMCNC: 33.5 G/DL (ref 32–36)
MCV RBC AUTO: 87.3 FL (ref 80–100)
PMV BLD AUTO: 10 FL (ref 7.4–10.4)
POTASSIUM SERPL-SCNC: 4.2 MMOL/L (ref 3.5–5.1)
RED CELL DISTRIBUTION WIDTH CV: 13.6 % (ref 11.5–14.5)
RED CELL DISTRIBUTION WIDTH SD: 43.8 FL (ref 36.4–46.3)
SODIUM SERPL-SCNC: 136 MMOL/L (ref 136–145)
WBC # BLD AUTO: 8.89 K/UL (ref 4.8–10.8)

## 2018-05-04 RX ADMIN — ACETAMINOPHEN SCH MG: 500 TABLET, COATED ORAL at 05:41

## 2018-05-04 RX ADMIN — PANTOPRAZOLE SCH MG: 40 TABLET, DELAYED RELEASE ORAL at 09:21

## 2018-05-04 RX ADMIN — TRAMADOL HYDROCHLORIDE PRN MG: 50 TABLET, COATED ORAL at 09:33

## 2018-05-04 RX ADMIN — OXYCODONE HYDROCHLORIDE PRN MG: 5 TABLET ORAL at 01:27

## 2018-05-04 RX ADMIN — CELECOXIB SCH MG: 200 CAPSULE ORAL at 09:21

## 2018-05-04 RX ADMIN — OXYCODONE HYDROCHLORIDE PRN MG: 5 TABLET ORAL at 11:30

## 2018-05-04 RX ADMIN — Medication SCH TAB: at 09:21

## 2018-05-04 RX ADMIN — OXYCODONE HYDROCHLORIDE PRN MG: 5 TABLET ORAL at 07:28

## 2018-05-04 RX ADMIN — RIVAROXABAN SCH MG: 10 TABLET, FILM COATED ORAL at 05:41

## 2018-05-04 RX ADMIN — DOCUSATE SODIUM SCH MG: 100 CAPSULE, LIQUID FILLED ORAL at 09:00

## 2018-05-04 RX ADMIN — INSULIN ASPART SCH UNITS: 100 INJECTION, SOLUTION INTRAVENOUS; SUBCUTANEOUS at 09:27

## 2018-05-04 RX ADMIN — INSULIN GLARGINE SCH UNITS: 100 INJECTION, SOLUTION SUBCUTANEOUS at 09:28

## 2018-05-04 NOTE — DISCHARGE INSTRUCTIONS
Discharge Instructions


Date of Service


May 4, 2018.





Admission


Reason for Admission:  Right Knee Degenerative Joint Disease





Discharge


Discharge Diagnosis / Problem:  Right TKA





Discharge Goals


Goal(s):  Improve function





Activity Recommendations


Activity Limitations:  as noted below





.





Instructions / Follow-Up


Instructions / Follow-Up


ACTIVITY RECOMMENDATIONS:





SELF CARE INSTRUCTIONS AFTER TOTAL KNEE REPLACEMENT





A.  You may need to continue a physical therapy program after discharge from 

the hospital.  There are several options available to you. 


      Your doctor will assist you in selecting the best one for you.





   1.  An out-patient facility 2 to 3 times a week for therapy or home therapy.


   2.  Continue working on all exercises taught to you in the hospital.  Your


                 goals should be to increase bending of your knee to 90 degrees 

and


                 beyond and to fully straighten your knee.





B.  You may progress at your own pace from walking with a walker or crutches to 

a cane; then to no assistive devices.





C.   Make walking a part of your daily routine.  Be up as much as comfortable 

with rest periods throughout the day.  


      Rest with leg elevation is very important. 


      Use the ice wrap frequently for the first 3-4 weeks.





D.  There are no restrictions on activities.  You may ride in a car, shop, 

participate in household chores and all social activities.





E.  Wear the long elastic stockings (MARY hose) 20 hours a day for 2 weeks after 

surgery.  


     They can be removed several times a day for laundering and for a bath.





F.  You may shower, no tub baths until cleared by your doctor.








SPECIAL CARE INSTRUCTIONS:





**VERY IMPORTANT TO READ AND REVIEW**





A.  There are a few signs you need to watch for after you are home.  Call  

[facility] if you notice any of the followin.  Increased severe knee pain.  Some pain is expected especially  when you 

exercise.


   2.  Increased swelling in your leg or knee; pain or swelling of the calf 

muscle in either lower leg.


   3.  Any fluid drainage from the incision.


   4.  Shortness of breath or chest pain.





B.  Please call [facility] at [phone] if you have any  

concerns or questions about your operation or recovery.  


     The doctor or his nurse will return your call promptly.





C.  You must take antibiotics before dental work, bladder, bowel or other 

surgery.  


      Your doctor will provide you with a permanent care to carry describing 

this precaution.





IMPORTANT:





*  REMEMBER TO TAKE ASPIRIN, 81 MG, TWICE DAILY FOR 4 WEEKS UNLESS OTHERWISE 

DIRECTED.  


   THIS IS YOUR BLOOD THINNER.





*  HIGH RISK PATIENTS MAY BE PRESCRIBED A STRONGER BLOOD THINNER.  


   THIS WILL BE PROVIDED AT DISCHARGE.





*  CALL IF INCREASED PAIN, REDNESS, DRAINAGE OR FEVER GREATER THAT 101.





*  WEAR MARY HOSE 20 HOURS PER DAY FOR 2 WEEKS.





*  YOU MAY HAVE A LARGE BAND-AID LIKE DRESSING (SILVERON).  THIS WILL  REMAIN 

ON YOUR INCISION FOR 7 DAYS, THEN CAN BE REMOVED. 


    IF INCISION IS LEAKING THROUGH DRESSING, CALL THE OFFICE [phone].








FOLLOW UP VISIT:





If appointment is not already scheduled:





Please call [facility] to make a follow-up appointment for 


2 weeks after your surgery at [phone].





Current Hospital Diet


Patient's current hospital diet: Diabetes Type 2 Diet





Discharge Diet


Recommended Diet:  Diabetes Type 2 Diet





Procedures


Procedures Performed:  


Right Total Knee Arthroplasty,lateral release,increased difficulty BMI 48.6





Pending Studies


Studies pending at discharge:  no





Laboratory Results





Hemoglobin A1c








Test


  18


09:46 Range/Units


 


 


Estimated Average Glucose 154   mg/dl


 


Hemoglobin A1c 7.0 H 4.5-5.6  %











Medical Emergencies








.


Who to Call and When:





Medical Emergencies:  If at any time you feel your situation is an emergency, 

please call [phone] immediately.





.





Non-Emergent Contact


Non-Emergency issues call your:  Primary Care Provider


.








"Provider Documentation" section prepared by Darrin Yen.








.

## 2018-05-04 NOTE — ORTHOPEDIC PROGRESS NOTE
Orthopedic Progress Note


Date of Service


May 4, 2018.





Subjective


Post OP Day:  2


Reports: feeling well, pain controlled w PO medications, Denies: complaints, 

chest pain, SOB, nausea / vomiting, light headedness, calf pain





Objective


calves soft nontender, N/V intact, capillary refill less than 2 sec., dressing C

/D/I, A&O x3, toes mobile











  Date Time  Temp Pulse Resp B/P (MAP) Pulse Ox O2 Delivery O2 Flow Rate FiO2


 


5/4/18 07:11 36.9 80 17 137/88 (104) 98 Room Air  


 


5/3/18 23:15      Room Air  


 


5/3/18 22:44 36.7 85 16 149/88 (108) 96 Room Air  


 


5/3/18 15:15      Room Air  


 


5/3/18 15:00 36.7 74 17 147/80 (102) 95 Room Air  


 


5/3/18 12:12 37.0 77 21 138/88 (105) 95 Room Air  


 


5/3/18 08:12 36.9 73 20 148/78 (101) 94 Room Air  


 


5/3/18 07:45     94 Room Air  


 


5/3/18 07:45      Room Air  








Laboratory Results 24 Hours:











Test


  5/4/18


05:28


 


Hematocrit 37.9 % 


 


Hemoglobin 12.7 g/dL 











Assessment & Plan


Assessment:


POD #2, Right TKA


Plan:


PT/ OT


DVT proph- Xarelto


D/C planning- OPPT today


As per medicine








Inhouse Planning


Pain Management:  Celebrex, Ultram, Morphine, PO Tylenol, Oxy IR


DVT Prophylaxis:  TEDs, SCDs, Xarelto





Discharge Planning


Discharge Planning:  home with oppt


Pain Management:  Celebrex, PO Tylenol, Oxy IR


DVT Prophylaxis:  TEDs, Xarelto


Therapy:  Physical Therapy, Occupational Therapy

## 2018-07-26 ENCOUNTER — HOSPITAL ENCOUNTER (OUTPATIENT)
Dept: HOSPITAL 45 - C.LABMFLN | Age: 48
Discharge: HOME | End: 2018-07-26
Attending: ORTHOPAEDIC SURGERY
Payer: COMMERCIAL

## 2018-07-26 DIAGNOSIS — Z01.812: Primary | ICD-10-CM

## 2018-07-26 LAB
ALBUMIN SERPL-MCNC: 3.5 GM/DL (ref 3.4–5)
BASOPHILS # BLD: 0.02 K/UL (ref 0–0.2)
BASOPHILS NFR BLD: 0.3 %
BUN SERPL-MCNC: 21 MG/DL (ref 7–18)
CALCIUM SERPL-MCNC: 8.6 MG/DL (ref 8.5–10.1)
CO2 SERPL-SCNC: 26 MMOL/L (ref 21–32)
CREAT SERPL-MCNC: 0.91 MG/DL (ref 0.6–1.4)
EOS ABS #: 0.24 K/UL (ref 0–0.5)
EOSINOPHIL NFR BLD AUTO: 219 K/UL (ref 130–400)
GLUCOSE SERPL-MCNC: 180 MG/DL (ref 70–99)
HBA1C MFR BLD: 8.6 % (ref 4.5–5.6)
HCT VFR BLD CALC: 43.8 % (ref 42–52)
HGB BLD-MCNC: 14.3 G/DL (ref 14–18)
IG#: 0.01 K/UL (ref 0–0.02)
IMM GRANULOCYTES NFR BLD AUTO: 30.8 %
INR PPP: 1 (ref 0.9–1.1)
LYMPHOCYTES # BLD: 1.85 K/UL (ref 1.2–3.4)
MCH RBC QN AUTO: 28.1 PG (ref 25–34)
MCHC RBC AUTO-ENTMCNC: 32.6 G/DL (ref 32–36)
MCV RBC AUTO: 86.2 FL (ref 80–100)
MONO ABS #: 0.35 K/UL (ref 0.11–0.59)
MONOCYTES NFR BLD: 5.8 %
NEUT ABS #: 3.54 K/UL (ref 1.4–6.5)
NEUTROPHILS # BLD AUTO: 4 %
NEUTROPHILS NFR BLD AUTO: 58.9 %
PMV BLD AUTO: 10.9 FL (ref 7.4–10.4)
POTASSIUM SERPL-SCNC: 4.4 MMOL/L (ref 3.5–5.1)
PTT PATIENT: 25.1 SECONDS (ref 21–31)
RED CELL DISTRIBUTION WIDTH CV: 14.2 % (ref 11.5–14.5)
RED CELL DISTRIBUTION WIDTH SD: 44.5 FL (ref 36.4–46.3)
SODIUM SERPL-SCNC: 138 MMOL/L (ref 136–145)
WBC # BLD AUTO: 6.01 K/UL (ref 4.8–10.8)

## 2018-08-07 NOTE — HISTORY & PHYSICAL EXAMINATION
DATE OF ADMISSION:  08/08/2018

 

CHIEF COMPLAINT:  Chronic left knee pain.

 

HISTORY OF PRESENT ILLNESS:  This is a 47-year-old male patient of Dr. Bethea's complaining of chronic left knee pain, longstanding, now

progressively getting worse.  Patient has been diagnosed with end-stage

osteoarthritis per clinical and radiographic exams.  Patient has failed

conservative treatment including intra-articular injections, tramadol and the

use of multiple anti-inflammatories.  He has also tried a brace.  Patient has

increased pain with weightbearing activities and his pain does interfere with

his activities of daily living.

 

PAST MEDICAL HISTORY:  Sleep apnea, diabetes mellitus, osteoarthritis,

sciatica, acid reflux, obesity.

 

SOCIAL HISTORY:  Nonsmoker, occasional drinker.

 

PAST SURGICAL HISTORY:  ACL, eye surgery and a right total knee replacement.

 

FAMILY HISTORY:  Noncontributory.

 

REVIEW OF SYSTEMS:  Chronic left knee pain, otherwise denies any shortness of

breath, chest pain, nausea, vomiting or any other joint complaints.

 

MEDICATIONS:  Prilosec 40 mg daily, glucosamine chondroitin 500 mg twice a

day, diclofenac sodium 75 mg twice daily, metformin 500 mg twice daily.

 

ALLERGIES:  No known drug allergies.

 

PHYSICAL EXAMINATION:

GENERAL:  Well-developed, well-nourished 47-year-old male in no acute

distress.  He is alert and oriented x3 and pleasant.

HEENT:  Normocephalic, atraumatic.  Extraocular motions are intact.  Pupils

are equal and reactive to light.

HEART:  Regular rate and rhythm.

LUNGS:  Clear.

ABDOMEN:  Soft and nontender.  Bowel sounds are present.

EXTREMITIES:  Left knee range of motion of 0-125 degrees with a mild

effusion.  He has a varus deformity with medial joint line tenderness.  He

has 5/5 strength.

NEUROLOGICALLY:  Neurovascularly he is intact in his left lower extremity.

 

DIAGNOSES:  Left knee end-stage osteoarthritis, sleep apnea, diabetes

mellitus, osteoarthritis, sciatica, acid reflux, obesity.

 

PLAN:  Patient was advised of his diagnoses.  Indications, risks, benefits,

postop course have all been reviewed.  Patient wished to proceed with a left

total knee arthroplasty.  Necessary consent forms, preoperative testing

clearances will be obtained.

## 2018-08-08 ENCOUNTER — HOSPITAL ENCOUNTER (INPATIENT)
Dept: HOSPITAL 45 - C.ACU | Age: 48
LOS: 2 days | Discharge: HOME | DRG: 470 | End: 2018-08-10
Attending: ORTHOPAEDIC SURGERY | Admitting: ORTHOPAEDIC SURGERY
Payer: COMMERCIAL

## 2018-08-08 VITALS
DIASTOLIC BLOOD PRESSURE: 82 MMHG | SYSTOLIC BLOOD PRESSURE: 130 MMHG | TEMPERATURE: 97.7 F | HEART RATE: 68 BPM | OXYGEN SATURATION: 95 %

## 2018-08-08 VITALS
OXYGEN SATURATION: 95 % | TEMPERATURE: 98.42 F | DIASTOLIC BLOOD PRESSURE: 99 MMHG | SYSTOLIC BLOOD PRESSURE: 157 MMHG | HEART RATE: 74 BPM

## 2018-08-08 VITALS
HEIGHT: 72 IN | WEIGHT: 315 LBS | BODY MASS INDEX: 42.66 KG/M2 | WEIGHT: 315 LBS | BODY MASS INDEX: 42.66 KG/M2 | BODY MASS INDEX: 42.66 KG/M2 | HEIGHT: 72 IN

## 2018-08-08 VITALS
TEMPERATURE: 98.06 F | DIASTOLIC BLOOD PRESSURE: 81 MMHG | HEART RATE: 68 BPM | OXYGEN SATURATION: 95 % | SYSTOLIC BLOOD PRESSURE: 133 MMHG

## 2018-08-08 VITALS
TEMPERATURE: 98.24 F | HEART RATE: 57 BPM | OXYGEN SATURATION: 94 % | DIASTOLIC BLOOD PRESSURE: 81 MMHG | SYSTOLIC BLOOD PRESSURE: 154 MMHG

## 2018-08-08 VITALS
HEART RATE: 64 BPM | SYSTOLIC BLOOD PRESSURE: 131 MMHG | TEMPERATURE: 98.06 F | OXYGEN SATURATION: 96 % | DIASTOLIC BLOOD PRESSURE: 80 MMHG

## 2018-08-08 VITALS
SYSTOLIC BLOOD PRESSURE: 126 MMHG | HEART RATE: 60 BPM | OXYGEN SATURATION: 95 % | TEMPERATURE: 97.7 F | DIASTOLIC BLOOD PRESSURE: 76 MMHG

## 2018-08-08 VITALS
TEMPERATURE: 98.24 F | HEART RATE: 61 BPM | SYSTOLIC BLOOD PRESSURE: 134 MMHG | DIASTOLIC BLOOD PRESSURE: 82 MMHG | OXYGEN SATURATION: 93 %

## 2018-08-08 VITALS
TEMPERATURE: 98.24 F | SYSTOLIC BLOOD PRESSURE: 125 MMHG | DIASTOLIC BLOOD PRESSURE: 80 MMHG | OXYGEN SATURATION: 91 % | HEART RATE: 62 BPM

## 2018-08-08 DIAGNOSIS — E11.9: ICD-10-CM

## 2018-08-08 DIAGNOSIS — E66.01: ICD-10-CM

## 2018-08-08 DIAGNOSIS — M54.30: ICD-10-CM

## 2018-08-08 DIAGNOSIS — G47.30: ICD-10-CM

## 2018-08-08 DIAGNOSIS — K21.9: ICD-10-CM

## 2018-08-08 DIAGNOSIS — M17.12: Primary | ICD-10-CM

## 2018-08-08 PROCEDURE — 0SRD0J9 REPLACEMENT OF LEFT KNEE JOINT WITH SYNTHETIC SUBSTITUTE, CEMENTED, OPEN APPROACH: ICD-10-PCS | Performed by: ORTHOPAEDIC SURGERY

## 2018-08-08 RX ADMIN — STANDARDIZED SENNA CONCENTRATE SCH MG: 8.6 TABLET ORAL at 21:18

## 2018-08-08 RX ADMIN — FERROUS GLUCONATE SCH MG: 324 TABLET ORAL at 17:55

## 2018-08-08 RX ADMIN — SODIUM CHLORIDE SCH MG: 9 INJECTION INTRAMUSCULAR; INTRAVENOUS; SUBCUTANEOUS at 23:29

## 2018-08-08 RX ADMIN — OXYCODONE HYDROCHLORIDE PRN MG: 5 TABLET ORAL at 19:03

## 2018-08-08 RX ADMIN — OXYCODONE HYDROCHLORIDE PRN MG: 5 TABLET ORAL at 14:17

## 2018-08-08 RX ADMIN — INSULIN ASPART SCH UNITS: 100 INJECTION, SOLUTION INTRAVENOUS; SUBCUTANEOUS at 17:56

## 2018-08-08 RX ADMIN — SODIUM CHLORIDE SCH MG: 9 INJECTION INTRAMUSCULAR; INTRAVENOUS; SUBCUTANEOUS at 17:55

## 2018-08-08 RX ADMIN — Medication SCH MG: at 21:18

## 2018-08-08 RX ADMIN — ACETAMINOPHEN SCH MG: 500 TABLET, COATED ORAL at 21:20

## 2018-08-08 RX ADMIN — DOCUSATE SODIUM SCH MG: 100 CAPSULE, LIQUID FILLED ORAL at 21:17

## 2018-08-08 RX ADMIN — ACETAMINOPHEN SCH MG: 500 TABLET, COATED ORAL at 14:18

## 2018-08-08 RX ADMIN — TRANEXAMIC ACID SCH MLS/HR: 100 INJECTION, SOLUTION INTRAVENOUS at 06:30

## 2018-08-08 RX ADMIN — CEFAZOLIN SCH MLS/MIN: 10 INJECTION, POWDER, FOR SOLUTION INTRAVENOUS at 16:14

## 2018-08-08 RX ADMIN — INSULIN ASPART SCH UNITS: 100 INJECTION, SOLUTION INTRAVENOUS; SUBCUTANEOUS at 14:22

## 2018-08-08 RX ADMIN — FERROUS GLUCONATE SCH MG: 324 TABLET ORAL at 14:18

## 2018-08-08 RX ADMIN — INSULIN ASPART SCH UNITS: 100 INJECTION, SOLUTION INTRAVENOUS; SUBCUTANEOUS at 21:18

## 2018-08-08 RX ADMIN — CEFAZOLIN SCH MLS/MIN: 10 INJECTION, POWDER, FOR SOLUTION INTRAVENOUS at 23:27

## 2018-08-08 RX ADMIN — SODIUM CHLORIDE SCH MLS/HR: 900 INJECTION, SOLUTION INTRAVENOUS at 23:29

## 2018-08-08 RX ADMIN — TRANEXAMIC ACID SCH MLS/HR: 100 INJECTION, SOLUTION INTRAVENOUS at 08:18

## 2018-08-08 RX ADMIN — SODIUM CHLORIDE SCH MLS/HR: 900 INJECTION, SOLUTION INTRAVENOUS at 14:17

## 2018-08-08 NOTE — MEDICAL CONSULT
Consultation


Date of Consultation:


Aug 8, 2018.


Attending Physician:


Roosevelt Bethea M.D.


Reason for Consultation:


post op medical management


History of Present Illness


Patient is a 46yo M with a PMH of bilateral knee OA (s/p recent R TKA in May 

2018), DM II and GERD who is POD #0 s/p L TKA by Dr. Bethea. Patient is doing 

well post-operatively. States that knee pain is a 0/10. Denies any fever, chills

, lightheadedness, headache, visual changes, sore throat, CP, SOB, abdominal 

pain, nausea, vomiting, dysuria or LE swelling. Has not urinated since 

procedure. Last bowel movement was early this AM.  PCP is Dr. Moraes with 

[facility] . Has DM II. Most recent hgb a1c was 8.6 in July 2018.





Past Medical/Surgical History


Medical Problems:


(1) Chronic back pain


Status: Chronic  





(2) Dislocation of right shoulder joint


Status: Resolved  





(3) DM type 2 (diabetes mellitus, type 2)


Status: Chronic  





(4) GERD (gastroesophageal reflux disease)


Status: Chronic  





(5) Left knee DJD


Status: Chronic  





(6) Morbid obesity with BMI of 45.0-49.9, adult


Status: Chronic  





(7) Right knee DJD


Status: Chronic  





(8) Right shoulder strain


Status: Resolved  





Surgical Problems:


(1) H/O eye surgery


Permanent Comment: L eye s/p eye injury


Status: Resolved  





(2) History of repair of anterior cruciate ligament of right knee


Status: Resolved  





(3) Status post right knee replacement


Permanent Comment: By Dr. Bethea in May 2018


Status: Resolved  








Family History





Cancer


Diabetes mellitus


Hypertension





Social History


Smoking Status:  Never Smoker


Alcohol Use:  occasionally (1-2x/month )


Drug Use:  none


Marital Status:  


Housing Status:  lives with family


Occupation Status:  employed





Allergies


Coded Allergies:  


     No Known Allergies (Unverified , 8/8/18)





Home Medications





Reported Home Medications








 Medications  Dose


 Route/Sig


 Max Daily Dose Days Date Category Dose


Instructions


 


 [Diclofenac ]    1 Tab


 PO BID


    7/26/18 Reported 


 


 Oxycodone HCl 5


 Mg Tab  5-10 Mg


 PO Q4H PRN


    5/4/18 Rx 


 


 Sb Non-Aspirin


 Extra Stre


  (Acetaminophen)


 500 Mg Tab  1,000 Mg


 PO Q8H


   21 5/4/18 Rx 


 


 Flexeril


  (Cyclobenzaprine


 Hcl) 10 Mg Tab  10 Mg


 PO TID PRN


    4/23/18 Reported 


 


 Glucosamine


 Chondroitin


  (Glucosamine-Chondroitin-Vit


 C-) Unknown


 Strength Cap  1 Tab


 PO BID


    4/23/18 Reported  PT RECEIVED INSTRUCTIONS FROM SURGEON OFFICE TO STOP 1 

WEEK PRIOR TO


 SURGERY - LAST DOSE WAS AM OF 04/22/18


 


 Glucophage


  (Metformin Hcl)


 500 Mg Tab  500 Mg


 PO BID


    10/22/16 Reported 


 


 Prilosec


  (Omeprazole) 40


 Mg Cap  40 Mg


 PO QAM


    11/15/12 Reported  take 1 hour prior first meal of the day











Current Inpatient Medications





Current Inpatient Medications








 Medications


  (Trade)  Dose


 Ordered  Sig/Nael


 Route  Start Time


 Stop Time Status Last Admin


Dose Admin


 


 Fentanyl Citrate


  (Fentanyl Inj)  50 mcg  Q5M  PRN


 IV  8/8/18 09:15


 8/8/18 14:15   


 


 


 Ondansetron HCl


  (Zofran Inj)  4 mg  ONE  PRN


 IV  8/8/18 09:15


 8/8/18 14:15   


 


 


 Ephedrine Sulfate


  (EpHEDrine


 SULFATE INJ)  5 mg  Q5M  PRN


 IV  8/8/18 09:15


 8/8/18 14:15   


 


 


 Atropine Sulfate


  (Atropine


 Sulfate 0.1mg/ml


 Inj)  0.5 mg  Q1M  PRN


 IV  8/8/18 09:15


 8/8/18 14:15   


 


 


 Metformin HCl


  (Glucophage Tab)  500 mg  BID


 PO  8/10/18 09:00


 9/9/18 08:59   


 


 


 Insulin Aspart


  (novoLOG ASPART)  **SLIDING


 SCALE**


 **G...  ACHS


 SC  8/8/18 13:00


 9/7/18 12:59   


 


 


 Morphine Sulfate


  (MoRPHine


 SULFATE INJ)  2 mg  Q4HWA  PRN


 IV  8/8/18 11:15


 8/22/18 11:14   


 


 


 Miscellaneous


 Information


  (Consult


 Glycemic


 Management


 Pharmacy)  1 ea  UD  PRN


 N/A  8/8/18 11:31


 9/7/18 11:30   


 


 


 Sodium Chloride  1,000 ml @ 


 100 mls/hr  Q10H


 IV  8/8/18 11:10


 8/9/18 11:09   


 


 


 Cefazolin Sodium


 2000 mg/Syringe  15 ml @ 


 3.75 mls/


 min  Q8H


 IV  8/8/18 16:00


 8/9/18 00:03   


 


 


 Ketorolac


 Tromethamine


  (Toradol Inj)  30 mg  Q6H


 IV.  8/8/18 18:00


 8/9/18 12:01   


 


 


 Celecoxib


  (CeleBREX CAP)  200 mg  BID


 PO  8/9/18 21:00


 9/8/18 20:59   


 


 


 Oxycodone HCl


  (Roxicodone


 Immediate Rel Tab)  1 TABLET


 FOR PAIN


 RATING...  Q4H  PRN


 PO  8/8/18 11:15


 8/22/18 11:14   


 


 


 Acetaminophen


  (Tylenol Tab)  1,000 mg  Q8H


 PO  8/8/18 14:00


 9/7/18 11:14   


 


 


 Magnesium


 Hydroxide


  (Milk Of


 Magnesia Susp)  30 ml  Q6H  PRN


 PO  8/8/18 11:15


 9/7/18 11:14   


 


 


 Bisacodyl


  (Dulcolax Supp)  10 mg  DAILY  PRN


 IN  8/8/18 11:15


 9/7/18 11:14   


 


 


 Senna


  (Senokot Tab)  17.2 mg  HS


 PO  8/8/18 21:00


 9/7/18 20:59   


 


 


 Docusate Sodium


  (coLACE CAP)  100 mg  BID


 PO  8/8/18 21:00


 9/7/18 20:59   


 


 


 Al Hydrox/Mg


 Hydrox/Simethicone


  (Maalox Max Susp)  15 ml  Q4H  PRN


 PO  8/8/18 11:15


 9/7/18 11:14   


 


 


 Multivitamins


  (Multivitamin


 Tab)  1 tab  QAM


 PO  8/9/18 09:00


 9/8/18 08:59   


 


 


 Ondansetron HCl


  (Zofran Inj)  4 mg  Q6H  PRN


 IV  8/8/18 11:15


 9/7/18 11:14   


 


 


 Ferrous Gluconate


  (Ferrous


 Gluconate Tab)  324 mg  TIDM


 PO  8/8/18 12:30


 9/7/18 12:29   


 


 


 Pantoprazole


 Sodium


  (Protonix Tab)  40 mg  QAM


 PO  8/9/18 09:00


 8/12/18 09:01   


 


 


 Aspirin


  (Ecotrin Tab)  81 mg  BID


 PO  8/8/18 21:00


 9/7/18 20:59   


 


 


 Glucose


  (Glucose 40% Gel)  15-30


 GRAMS 15


 GRAMS...  UD  PRN


 PO  8/8/18 13:15


 9/7/18 13:14   


 


 


 Glucose


  (Glucose Chew


 Tab)  4-8


 Tablets 4


 Tabl...  UD  PRN


 PO  8/8/18 13:15


 9/7/18 13:14   


 


 


 Dextrose


  (Dextrose 50%


 50ML Syringe)  25-50ML


 25ML FOR


 ...  UD  PRN


 IV  8/8/18 13:15


 9/7/18 13:14   


 


 


 Glucagon


  (Glucagon Inj)  1 mg  UD  PRN


 IM  8/8/18 13:15


 9/7/18 13:14   


 


 


 Carbohydrates


  (Carbohydrates


 For Hypoglycemia)  15-30 GRAMS


 15 grams if


 BSG 54-69...  UD  PRN


 PO  8/8/18 13:15


 9/7/18 13:14   


 











Review of Systems


Ten systems reviewed and negative except as noted in the HPI.





Physical Exam











  Date Time  Temp Pulse Resp B/P (MAP) Pulse Ox O2 Delivery O2 Flow Rate FiO2


 


8/8/18 12:51 36.5 68 16 130/82 (98) 95 Nasal Cannula 3.0 


 


8/8/18 12:20 36.5 60 16 126/76 (93) 95 Nasal Cannula 3.0 


 


8/8/18 11:50      Nasal Cannula  


 


8/8/18 11:50      Nasal Cannula  


 


8/8/18 11:50 36.7 64 18 131/80 (97) 96 Nasal Cannula 3.0 


 


8/8/18 11:40 36.4       


 


8/8/18 11:35 36.4 74 16 121/84 94 Nasal Cannula 2 


 


8/8/18 11:25  58 16 125/81 95 Nasal Cannula 2 


 


8/8/18 11:15  67 16 139/80 100 Oxymask 10 


 


8/8/18 11:09 36.5 71 16 138/84 98 Oxymask 10 


 


8/8/18 07:10 36.9 74 20 157/99 95 Room Air  








General Appearance:  WD/WN, no apparent distress, + obese, + pertinent finding (

Sitting upright eating lunch)


Head:  normocephalic, atraumatic


Eyes:  normal inspection, PERRL, sclerae normal


ENT:  normal ENT inspection, hearing grossly normal, pharynx normal (mucous 

membranes moist)


Neck:  supple, thyroid normal, trachea midline


Respiratory/Chest:  chest non-tender, lungs clear, normal breath sounds, no 

respiratory distress, no accessory muscle use


Cardiovascular:  regular rate, rhythm, no murmur, normal peripheral pulses


Abdomen/GI:  non tender, soft, no organomegaly


Back:  normal inspection


Extremities/Musculoskelatal:  normal inspection, no calf tenderness, no pedal 

edema, + pertinent finding (L knee with surgical wrap. Clean, dry, intact. 

Drain visualized. Distal pulses and cap refill intact. SCDs and deja hose on 

BLE. )


Neurologic/Psych:  CNs II-XII nml as tested, no motor/sensory deficits, normal 

mood/affect, oriented x 3


Skin:  normal color, warm/dry





Laboratory Results





Last 24 Hours








Test


  8/8/18


06:58 8/8/18


11:10 8/8/18


12:11


 


Bedside Glucose 225 mg/dl  238 mg/dl  231 mg/dl 











Assessment & Plan


Patient is a 46yo M with a PMH of bilateral knee OA (s/p recent R TKA in May 

2018), DM II and GERD who is POD #0 s/p L TKA by Dr. Bethea. 





Left OA s/p left TKA 


-POD#0, performed by Dr. Bethea.


-Doing well post-operatively 


-Per ortho for pain control, wound care, anticoagulation and activities


-Monitor H&H, continue incentive spirometry, PT/OT when appropriate 





DM II


-A1c 8.6 in July 2018 


-Hold home metformin


-Glycemic control consult placed by ortho 


-BSG check AC HS 





GERD


-Continue PPI 





Chronic back pain


-Home oxycodone, Flexeril held 


-Receiving pain regimen per ortho 





PCP: Dr. Moraes 


Dispo: Per ortho 





Patient seen today in collaboration with Dr. Duran. Please see addendum. 





Dr. Saleh will be following patient for remainder of hospitalization. 





Thank you for this consultation.


We will follow the patient with you during their hospital stay.


You can [facility] a [facility] via pager @ [phone].








Attending Note





Patient is a 47 yr male with PMH of DM II, GERD, Morbid Obesity, OA who 

underwent elective left TKA by  was consulted for medical management. 

Patient is seen and examined Post OP. Patient's knee pain is controlled. Denies 

any chest pain, SOB, dizziness. His blood sugar levels are elevated in 200s. On 

exam patient is in No distress, Obese, Left knee in surgical dressing, No edema

, distant heart sounds, CTA. Patient's Metformin will be held and Insulin 

therapy for glycemic management. DVT Px/Activity as per Ortho. Monitor for post 

Op anemia. Bowel regimen to prevent constipation. I personally reviewed the 

record. Patient is interviewed and examined at bedside. Patient's care is 

coordinated with Kerri Reyes PA-C. Please refer to the documentation above for 

details of patient's presentation and for discussion of other issues.

## 2018-08-08 NOTE — PHARMACY PROGRESS NOTE
Pharmacy Glycemic Short Note 2


Date of Service


Aug 8, 2018.





OUTPATIENT ANTIDIABETIC REGIMEN: 


* Metformin 500mg PO BID


* HbA1c:  8.6%  (7/26/18)











ASSESSMENT:


* Mr Meadows is a 48yo diabetic male s/p L TKA this morning. 


* BSGs have been elevated since admission (225, 231).  


* SQ basal/bolus insulin initiated post-op











PLAN FOR INPATIENT GLYCEMIC CONTROL:


* Hold outpatient oral diabetes medications


 * Will consider resuming tomorrow if diet tolerated post-op and if renal 

function is stable.





* Basal insulin


 * Lantus 40 units SQ x1 dose ASAP upon arrival to floor post-op


 * Supplemental Lantus dose this evening, if needed based on BSG





* Bolus insulin


 * NovoLog per scale ACHS or Q6hrs while NPO, plus 0200 check 


 * Goal Range:  Low 80 mg/dL - High 150 mg/dL


 * Correction Factor:  15 mg/dL/unit


 * Nutritional / Prandial insulin per carb ratio of  1 unit per 5 grams CHO 

consumed








PLAN FOR DISCHARGE:


* Based on recent A1c (8.6%), patient's diabetes is sub-optimally managed as an 

outpt.


* Consider increasing his dose of Metformin on discharge.

## 2018-08-08 NOTE — [facility] OPERATIVE REPORT
Operative Report


Operative Date


Aug 8, 2018.





Pre-Operative Diagnosis





Left Knee End-Stage Osteoarthritis,morbid obesity





Post-Operative Diagnosis


Same





Procedure(s) Performed


Left total knee arthroplasty, increased difficulty morbid obesity BMI 46





Surgeon


Dr Bethea





Assistant Surgeon(s)


Anurag Carney PA-C





Estimated Blood Loss


5CC





Findings


End-stage medial compartment OA chronic ACL tear bone loss medial compartment 

tricompartmental osteophytes





Specimens





A: Left Knee Bone and Tissue





Drains


2 Hemovac





Anesthesia


Spinal sedation regional block orthomix





Complication(s)


None





Disposition


Recovery Room / PACU





Indications


End-stage osteoarthritis left knee failed conservative management history of 

previous right knee replacement doing well with right knee.





Description of Procedure


Patient taken to the operating room the size under spinal sedation regional 

block anesthesia.  Patient was placed supine on the operating table.  A 

pneumatic tourniquet was placed about the left upper obese thigh.  The left 

lower extremity was prepped and draped in sterile fashion.  Knee exam 

demonstrated instability with Lachman bone-on-bone crepitation with varus knee 0

-120 range of motion.  The leg was elevated exsanguinated with an Esmarch 

bandage and pneumatic tourniquet was raised to 350 millimeters of mercury.  

Skin incised sharply in longitudinal fashion.  Subcutaneous flaps elevated.  

Incision was made through the medial retinaculum extending up in the mid third 

of the quadriceps tendon and down to the medial tibial tubercle.  Intra-

articular findings demonstrated tricompartmental DJD with posterior medial bone 

loss tibia and bone-on-bone medial compartment with chronic ACL tear.  The 

[employer] total knee arthroplasty system was used.  To expose the 

knee the infrapatellar fat pad was resected.  The meniscal remnants and 

cruciate ligaments were resected.  The anterior fat pad over the femur in the 

area of the anterior flange of the femoral component was resected.  Lateral 

synovial bands release.  The femur was exposed.  An intramedullary drill hole 

was made into the canal.  A guidewire was placed.  Distal femoral cutting guide 

was adjusted to resect a 5 degree valgus cut with standard distal femur 

resected.  The knee was extended and a subperiosteal peel lateral release was 

performed around the patella.  Patella width was measured and width was 

reproduced using a freehand cut technique and a 38 mm symmetrical patella 

component.  The 3 drill holes were made and the excess lateral facet was 

beveled off to prevent any impingement.  Attention was taken back to the femur 

which was exposed with retractors and the femoral sizing guide was pinned in 

position.  The drill holes were placed in 3 of external rotation to match 

epicondylar axis.  Femur sized for a 7 component.  The 4-in-1 cutting block was 

placed and then the anterior posterior and chamfer cuts are made.  The tibia 

was then subluxed.  The external tibial cutting guide was just to make a 

perpendicular cut to the long axis of the tibia below the most deficient bone 

loss side.  A lamina  was used and the flexion extension gaps were 

balanced.  All posterior osteophytes removed.  All meniscal remnants were 

resected.  The tibia exposed and the trial tibial component size 7 was 

externally rotated in line with the tibial tubercle and pinned in position.  

The punch for stem was used.  The notch cutting device was centered 

appropriately and the femoral notch cut was made.  The femoral trial was 

inserted.  Trial tibial inserts were placed and size 11 gave balanced ligaments 

through flexion and extension.  Patella tracking was assessed.  The patella 

tracked centrally.  The trial components were then removed and the orthomix 

anesthetic cocktail was injected per protocol.  The knee was then copiously 

irrigated with pulsatile lavage antibiotic solution.  Final components were 

then cemented with Simplex cement.  Final components were [employer] 

7 femoral component left, 7 primary tibial baseplate, posterior stabilized 11 

poly-insert, 38 symmetrical patella.  While the cement cured the Betadine soak 

was used per protocol.  After cement cured further pulsatile lavage irrigation 

performed and 2 Hemovac drains were brought out laterally.  The quadriceps 

tendon and medial retinaculum were closed with figure of 8 #1 Vicryl sutures.  

The knee was taken through full range of motion and the repair was secure.  The 

subcutaneous tissues were closed with 2-0 Vicryl sutures.  Skin was closed with 

staples.  Sterile dressings were applied.  Patient procedure well.  There was 

increased difficulty due to morbid obesity.  Anurag CRAFT was my physician 

assistant who assisted in patient positioning prepping and draping,leg 

positioning ,soft tissue retraction and instrument management and participated 

in the closing and will participate in postoperative care of the patient.  The 

patient tolerated the procedure well.


I attest to the content of the Intraoperative Record and any orders documented 

therein.  Any exceptions are noted below.

## 2018-08-08 NOTE — [facility] POST OPERATIVE BRIEF NOTE
Immediate Operative Summary


Operative Date


Aug 8, 2018.





Pre-Operative Diagnosis





Left Knee End-Stage Osteoarthritis,morbid obesity





Post-Operative Diagnosis





Left Knee End-Stage Osteoarthritis,morbid obesity





Procedure(s) Performed





Left Total Knee Arthroplasty,increased difficulty bmi46.2





Surgeon


Dr Bethea





Assistant Surgeon(s)


Anurag Carney PA-C





Estimated Blood Loss


5CC





Findings


Consistent with Post-Op Diagnosis





Specimens





A: Left Knee Bone and Tissue





Drains


None





Anesthesia Type


MAC Spinal Regional





Complication(s)


none





Disposition


Disposition:  Recovery Room / PACU





Overlapping Procedure


I was present for:  the critical portions of procedure.

## 2018-08-08 NOTE — ANESTHESIOLOGY PROGRESS NOTE
Anesthesia Post Op Note


Date & Time


Aug 8, 2018 at 11:41





Vital Signs


Pain Intensity:  0





Vital Signs Past 12 Hours








  Date Time  Temp Pulse Resp B/P (MAP) Pulse Ox O2 Delivery O2 Flow Rate FiO2


 


8/8/18 11:35 36.4 74 16 121/84 94 Nasal Cannula 2 


 


8/8/18 11:25  58 16 125/81 95 Nasal Cannula 2 


 


8/8/18 11:15  67 16 139/80 100 Oxymask 10 


 


8/8/18 11:09 36.5 71 16 138/84 98 Oxymask 10 


 


8/8/18 07:10 36.9 74 20 157/99 95 Room Air  











Notes


Mental Status:  alert / awake / arousable, participated in evaluation


Pt Amnestic to Procedure:  Yes


Nausea / Vomiting:  adequately controlled


Pain:  adequately controlled


Airway Patency, RR, SpO2:  stable & adequate


BP & HR:  stable & adequate


Hydration State:  stable & adequate


Neuraxial Anesthesia:  was administered, sensory block is resolving


Anesthetic Complications:  no major complications apparent

## 2018-08-08 NOTE — DIAGNOSTIC IMAGING REPORT
L KNEE 1 OR 2 VIEWS ROUTINE



CLINICAL HISTORY: 47 years-old Male presenting with AP/LATERAL IN PACU LEFT

KNEE. 



TECHNIQUE: Frontal and crosstable lateral views of the left knee were obtained. 



COMPARISON: None.



FINDINGS:

Postsurgical changes of total left knee arthroplasty with patellar resurfacing.

No malalignment. No periprosthetic fracture. No hardware complication. Expected

soft tissue and intra-articular gas. A surgical drain is in place. Overlying

skin staples.   



IMPRESSION:

Expected postsurgical appearance status post total left knee arthroplasty with

patellar resurfacing.







Electronically signed by:  Wilmar Cotton M.D.

8/8/2018 11:28 AM



Dictated Date/Time:  8/8/2018 11:27 AM

## 2018-08-09 VITALS — SYSTOLIC BLOOD PRESSURE: 143 MMHG | OXYGEN SATURATION: 98 % | DIASTOLIC BLOOD PRESSURE: 86 MMHG | HEART RATE: 57 BPM

## 2018-08-09 VITALS
HEART RATE: 55 BPM | OXYGEN SATURATION: 97 % | SYSTOLIC BLOOD PRESSURE: 132 MMHG | DIASTOLIC BLOOD PRESSURE: 78 MMHG | TEMPERATURE: 98.06 F

## 2018-08-09 VITALS
SYSTOLIC BLOOD PRESSURE: 146 MMHG | OXYGEN SATURATION: 97 % | HEART RATE: 57 BPM | DIASTOLIC BLOOD PRESSURE: 80 MMHG | TEMPERATURE: 98.06 F

## 2018-08-09 VITALS
TEMPERATURE: 98.24 F | SYSTOLIC BLOOD PRESSURE: 134 MMHG | DIASTOLIC BLOOD PRESSURE: 78 MMHG | HEART RATE: 74 BPM | OXYGEN SATURATION: 97 %

## 2018-08-09 VITALS
HEART RATE: 51 BPM | TEMPERATURE: 97.52 F | DIASTOLIC BLOOD PRESSURE: 82 MMHG | SYSTOLIC BLOOD PRESSURE: 131 MMHG | OXYGEN SATURATION: 96 %

## 2018-08-09 VITALS — OXYGEN SATURATION: 97 %

## 2018-08-09 VITALS
DIASTOLIC BLOOD PRESSURE: 76 MMHG | TEMPERATURE: 98.6 F | HEART RATE: 61 BPM | OXYGEN SATURATION: 95 % | SYSTOLIC BLOOD PRESSURE: 130 MMHG

## 2018-08-09 LAB
BUN SERPL-MCNC: 21 MG/DL (ref 7–18)
CALCIUM SERPL-MCNC: 8.4 MG/DL (ref 8.5–10.1)
CO2 SERPL-SCNC: 27 MMOL/L (ref 21–32)
CREAT SERPL-MCNC: 0.86 MG/DL (ref 0.6–1.4)
EOSINOPHIL NFR BLD AUTO: 200 K/UL (ref 130–400)
GLUCOSE SERPL-MCNC: 181 MG/DL (ref 70–99)
HCT VFR BLD CALC: 38.5 % (ref 42–52)
HGB BLD-MCNC: 12.6 G/DL (ref 14–18)
MCH RBC QN AUTO: 27.5 PG (ref 25–34)
MCHC RBC AUTO-ENTMCNC: 32.7 G/DL (ref 32–36)
MCV RBC AUTO: 83.9 FL (ref 80–100)
PMV BLD AUTO: 10.3 FL (ref 7.4–10.4)
POTASSIUM SERPL-SCNC: 3.8 MMOL/L (ref 3.5–5.1)
RED CELL DISTRIBUTION WIDTH CV: 14 % (ref 11.5–14.5)
RED CELL DISTRIBUTION WIDTH SD: 42.6 FL (ref 36.4–46.3)
SODIUM SERPL-SCNC: 136 MMOL/L (ref 136–145)
WBC # BLD AUTO: 9.56 K/UL (ref 4.8–10.8)

## 2018-08-09 RX ADMIN — OXYCODONE HYDROCHLORIDE PRN MG: 5 TABLET ORAL at 01:57

## 2018-08-09 RX ADMIN — SODIUM CHLORIDE SCH MLS/HR: 900 INJECTION, SOLUTION INTRAVENOUS at 09:21

## 2018-08-09 RX ADMIN — ACETAMINOPHEN SCH MG: 500 TABLET, COATED ORAL at 21:08

## 2018-08-09 RX ADMIN — SODIUM CHLORIDE SCH MG: 9 INJECTION INTRAMUSCULAR; INTRAVENOUS; SUBCUTANEOUS at 05:46

## 2018-08-09 RX ADMIN — Medication SCH MG: at 21:06

## 2018-08-09 RX ADMIN — Medication SCH TAB: at 08:54

## 2018-08-09 RX ADMIN — Medication SCH MG: at 08:55

## 2018-08-09 RX ADMIN — STANDARDIZED SENNA CONCENTRATE SCH MG: 8.6 TABLET ORAL at 21:06

## 2018-08-09 RX ADMIN — DOCUSATE SODIUM SCH MG: 100 CAPSULE, LIQUID FILLED ORAL at 21:06

## 2018-08-09 RX ADMIN — INSULIN ASPART SCH UNITS: 100 INJECTION, SOLUTION INTRAVENOUS; SUBCUTANEOUS at 12:44

## 2018-08-09 RX ADMIN — ACETAMINOPHEN SCH MG: 500 TABLET, COATED ORAL at 05:47

## 2018-08-09 RX ADMIN — FERROUS GLUCONATE SCH MG: 324 TABLET ORAL at 08:54

## 2018-08-09 RX ADMIN — METFORMIN HYDROCHLORIDE SCH MG: 500 TABLET, FILM COATED ORAL at 18:14

## 2018-08-09 RX ADMIN — SODIUM CHLORIDE SCH MG: 9 INJECTION INTRAMUSCULAR; INTRAVENOUS; SUBCUTANEOUS at 12:14

## 2018-08-09 RX ADMIN — FERROUS GLUCONATE SCH MG: 324 TABLET ORAL at 12:14

## 2018-08-09 RX ADMIN — OXYCODONE HYDROCHLORIDE PRN MG: 5 TABLET ORAL at 11:11

## 2018-08-09 RX ADMIN — DOCUSATE SODIUM SCH MG: 100 CAPSULE, LIQUID FILLED ORAL at 08:55

## 2018-08-09 RX ADMIN — FERROUS GLUCONATE SCH MG: 324 TABLET ORAL at 18:13

## 2018-08-09 RX ADMIN — ACETAMINOPHEN SCH MG: 500 TABLET, COATED ORAL at 14:20

## 2018-08-09 RX ADMIN — PANTOPRAZOLE SCH MG: 40 TABLET, DELAYED RELEASE ORAL at 08:55

## 2018-08-09 RX ADMIN — CELECOXIB SCH MG: 200 CAPSULE ORAL at 21:07

## 2018-08-09 RX ADMIN — INSULIN ASPART SCH UNITS: 100 INJECTION, SOLUTION INTRAVENOUS; SUBCUTANEOUS at 21:07

## 2018-08-09 RX ADMIN — INSULIN ASPART SCH UNITS: 100 INJECTION, SOLUTION INTRAVENOUS; SUBCUTANEOUS at 18:12

## 2018-08-09 RX ADMIN — OXYCODONE HYDROCHLORIDE PRN MG: 5 TABLET ORAL at 19:40

## 2018-08-09 RX ADMIN — INSULIN ASPART SCH UNITS: 100 INJECTION, SOLUTION INTRAVENOUS; SUBCUTANEOUS at 09:00

## 2018-08-09 NOTE — ORTHOPEDIC PROGRESS NOTE
Orthopedic Progress Note


Date of Service


Aug 9, 2018.





Subjective


Post OP Day:  1


Reports: feeling well, pain controlled w PO medications, Denies: complaints, 

chest pain, SOB, nausea / vomiting, light headedness, calf pain





Objective


calves soft nontender, N/V intact, capillary refill less than 2 sec., dressing C

/D/I, A&O x3, toes mobile











  Date Time  Temp Pulse Resp B/P (MAP) Pulse Ox O2 Delivery O2 Flow Rate FiO2


 


8/9/18 07:58 36.4 51 16 131/82 (98) 96 Room Air  


 


8/9/18 07:45     97   


 


8/9/18 04:29 36.7 55 18 132/78 (96) 97 Room Air  


 


8/8/18 23:25      Room Air  


 


8/8/18 23:15 36.7 68 18 133/81 (98) 95 Room Air  


 


8/8/18 18:54 36.8 57 18 154/81 (105) 94 Room Air  


 


8/8/18 15:16      Room Air  


 


8/8/18 14:58 36.8 61 18 134/82 (99) 93 Room Air  


 


8/8/18 13:50 36.8 62 16 125/80 (95) 91 Room Air  


 


8/8/18 12:51 36.5 68 16 130/82 (98) 95 Nasal Cannula 3.0 


 


8/8/18 12:20 36.5 60 16 126/76 (93) 95 Nasal Cannula 3.0 


 


8/8/18 11:50      Nasal Cannula  


 


8/8/18 11:50      Nasal Cannula  


 


8/8/18 11:50 36.7 64 18 131/80 (97) 96 Nasal Cannula 3.0 


 


8/8/18 11:40 36.4       


 


8/8/18 11:35 36.4 74 16 121/84 94 Nasal Cannula 2 


 


8/8/18 11:25  58 16 125/81 95 Nasal Cannula 2 


 


8/8/18 11:15  67 16 139/80 100 Oxymask 10 


 


8/8/18 11:09 36.5 71 16 138/84 98 Oxymask 10 








Laboratory Results 24 Hours:











Test


  8/9/18


05:46


 


Hematocrit 38.5 % 


 


Hemoglobin 12.6 g/dL 











Assessment & Plan


Assessment:


POD #1, Left TKA


Plan:


PT/ OT


DVT proph- ASA


D/C planning- Home w OPPT


As per medicine.








Inhouse Planning


Pain Management:  Celebrex, Toradol, Morphine, PO Tylenol, Oxy IR


DVT Prophylaxis:  TEDs, SCDs, ASA





Discharge Planning


Discharge Planning:  home with oppt


Pain Management:  Celebrex, PO Tylenol, Oxy IR


DVT Prophylaxis:  TEDs, ASA


Therapy:  Physical Therapy, Occupational Therapy

## 2018-08-09 NOTE — PHARMACY PROGRESS NOTE
Pharmacy Glycemic Short Note 2


Date of Service


Aug 9, 2018.





OUTPATIENT ANTIDIABETIC REGIMEN: 


* Metformin 500mg PO BID


* HbA1c:  8.6%  (7/26/18)











ASSESSMENT:


* Mr Meadows is a 46yo diabetic male POD #1 s/p L TKA.


* SQ basal/bolus insulin initiated post-op


* BSGs have been elevated, but are improving.  Will continue to adjust insulin 

regimen as needed.


* Discharge likely tomorrow











PLAN FOR INPATIENT GLYCEMIC CONTROL:


* Resume Metformin 500mg PO BID this evening





* Basal insulin


 * Lantus 60 units total yesterday


 * Lantus 30 units this am, will supplement this evening depending on BSG





* Bolus insulin


 * NovoLog per scale ACHS or Q6hrs while NPO, plus 0200 check 


 * Goal Range:  Low 110 mg/dL - High 140 mg/dL


 * Correction Factor:  15 mg/dL/unit


 * Nutritional / Prandial insulin per carb ratio of  1 unit per 5 grams CHO 

consumed








PLAN FOR DISCHARGE:


* Based on recent A1c (8.6%), patient's diabetes is sub-optimally managed as an 

outpt.


* Consider increasing his dose of Metformin on discharge.

## 2018-08-09 NOTE — PROGRESS NOTE
Medicine Progress Note


Date & Time of Visit:


Aug 9, 2018 at 12:14.


Subjective


Patient seen and examined. 


Has just participated in PT and is sitting in chair. 


Knee pain is a 5/10. Was just given pain medication.


No numbness/paresthesias of left leg. 


Denies fever, chills, lightheadedness, chest pain, SOB, nausea, vomiting or 

abdominal pain. 


Urinating without issue. Passing gas but no bowel movement since yesterday.





Objective





Last 8 Hrs








  Date Time  Temp Pulse Resp B/P (MAP) Pulse Ox O2 Delivery O2 Flow Rate FiO2


 


8/9/18 11:10 37.0 61 16 130/76 (94) 95 Room Air  


 


8/9/18 10:56  57   98   


 


8/9/18 07:58 36.4 51 16 131/82 (98) 96 Room Air  


 


8/9/18 07:45     97   


 


8/9/18 04:29 36.7 55 18 132/78 (96) 97 Room Air  








Physical Exam:


General Appearance:  WD/WN, no apparent distress


Head:  normocephalic, atraumatic


Eyes:  normal inspection, PERRL, EOMI


ENT:  hearing grossly normal, pharynx normal


Neck:  supple, no JVD, no adenopathy 


Respiratory/Chest:  lungs clear to auscultation. No wheezes, rales or rhonci. 

No respiratory distress or accessory muscle use


Cardiovascular:  regular rate, rhythm, no murmur, normal peripheral pulses


Abdomen/GI:  normal bowel sounds, soft, non-tender to palpation


Extremities/Musculoskelatal:  normal inspection, no calf tenderness, normal 

capillary refill, no pedal edema. + L knee surgical wrap clean, dry, intact. 

Drain visualized. 


Neurologic/Psych:  alert, normal mood/affect, oriented x 3


Skin:  normal color, warm/dry


Laboratory Results:





Last 24 Hours








Test


  8/8/18


17:16 8/8/18


21:07 8/9/18


01:43 8/9/18


05:46


 


Bedside Glucose 244 mg/dl  223 mg/dl  206 mg/dl  


 


White Blood Count    9.56 K/uL 


 


Red Blood Count    4.59 M/uL 


 


Hemoglobin    12.6 g/dL 


 


Hematocrit    38.5 % 


 


Mean Corpuscular Volume    83.9 fL 


 


Mean Corpuscular Hemoglobin    27.5 pg 


 


Mean Corpuscular Hemoglobin


Concent 


  


  


  32.7 g/dl 


 


 


RDW Standard Deviation    42.6 fL 


 


RDW Coefficient of Variation    14.0 % 


 


Platelet Count    200 K/uL 


 


Mean Platelet Volume    10.3 fL 


 


Sodium Level    136 mmol/L 


 


Potassium Level    3.8 mmol/L 


 


Chloride Level    103 mmol/L 


 


Carbon Dioxide Level    27 mmol/L 


 


Anion Gap    5.0 mmol/L 


 


Blood Urea Nitrogen    21 mg/dl 


 


Creatinine    0.86 mg/dl 


 


Est Creatinine Clear Calc


Drug Dose 


  


  


  162.8 ml/min 


 


 


Estimated GFR (


American) 


  


  


  119.7 


 


 


Estimated GFR (Non-


American 


  


  


  103.3 


 


 


BUN/Creatinine Ratio    24.6 


 


Random Glucose    181 mg/dl 


 


Calcium Level    8.4 mg/dl 


 


Magnesium Level    1.9 mg/dl 


 


Test


  8/9/18


08:17 


  


  


 


 


Bedside Glucose 149 mg/dl    











Assessment & Plan


Patient is a 46yo M with a PMH of bilateral knee OA (s/p recent R TKA in May 

2018), DM II and GERD who is POD #1 s/p L TKA by Dr. Bethea. 





Left OA s/p left TKA 


-POD#1, performed by Dr. Bethea


-Doing well post-operatively 


-Per ortho for pain control, wound care, anticoagulation and activities


-Monitor H&H, continue incentive spirometry, PT/OT when appropriate


-Per ortho note, plan for discharge tomorrow 





DM II


-A1c 8.6 in July 2018 


-Hold home metformin


-Glycemic control consult placed by ortho 


-BSG check AC HS 





GERD


-Continue PPI 





Chronic back pain


-Home oxycodone, Flexeril held 


-Receiving pain regimen per ortho 





PCP: Dr. Moraes 


Dispo: Per ortho 





Patient seen today in collaboration with Dr. Saleh. Please see addendum. 





Thank you for this consultation.


We will follow the patient with you during their hospital stay.


You can reach a member of the [facility] Hospitalist Team 24/7 via pager @ [phone].


Current Inpatient Medications:





Current Inpatient Medications








 Medications


  (Trade)  Dose


 Ordered  Sig/Nael


 Route  Start Time


 Stop Time Status Last Admin


Dose Admin


 


 Insulin Aspart


  (novoLOG ASPART)  **SLIDING


 SCALE**


 **G...  ACHS


 SC  8/8/18 13:00


 9/7/18 12:59  8/9/18 09:00


8 UNITS


 


 Morphine Sulfate


  (MoRPHine


 SULFATE INJ)  2 mg  Q4HWA  PRN


 IV  8/8/18 11:15


 8/22/18 11:14   


 


 


 Miscellaneous


 Information


  (Consult


 Glycemic


 Management


 Pharmacy)  1 ea  UD  PRN


 N/A  8/8/18 11:31


 9/7/18 11:30   


 


 


 Celecoxib


  (CeleBREX CAP)  200 mg  BID


 PO  8/9/18 21:00


 9/8/18 20:59   


 


 


 Oxycodone HCl


  (Roxicodone


 Immediate Rel Tab)  1 TABLET


 FOR PAIN


 RATING...  Q4H  PRN


 PO  8/8/18 11:15


 8/22/18 11:14  8/9/18 11:11


10 MG


 


 Acetaminophen


  (Tylenol Tab)  1,000 mg  Q8H


 PO  8/8/18 14:00


 9/7/18 11:14  8/9/18 05:47


1,000 MG


 


 Magnesium


 Hydroxide


  (Milk Of


 Magnesia Susp)  30 ml  Q6H  PRN


 PO  8/8/18 11:15


 9/7/18 11:14   


 


 


 Bisacodyl


  (Dulcolax Supp)  10 mg  DAILY  PRN


 [place]  8/8/18 11:15


 9/7/18 11:14   


 


 


 Senna


  (Senokot Tab)  17.2 mg  HS


 PO  8/8/18 21:00


 9/7/18 20:59  8/8/18 21:18


17.2 MG


 


 Docusate Sodium


  (coLACE CAP)  100 mg  BID


 PO  8/8/18 21:00


 9/7/18 20:59  8/9/18 08:55


100 MG


 


 Al Hydrox/Mg


 Hydrox/Simethicone


  (Maalox Max Susp)  15 ml  Q4H  PRN


 PO  8/8/18 11:15


 9/7/18 11:14   


 


 


 Multivitamins


  (Multivitamin


 Tab)  1 tab  QAM


 PO  8/9/18 09:00


 9/8/18 08:59  8/9/18 08:54


1 TAB


 


 Ondansetron HCl


  (Zofran Inj)  4 mg  Q6H  PRN


 IV  8/8/18 11:15


 9/7/18 11:14   


 


 


 Ferrous Gluconate


  (Ferrous


 Gluconate Tab)  324 mg  TIDM


 PO  8/8/18 12:30


 9/7/18 12:29  8/9/18 08:54


324 MG


 


 Pantoprazole


 Sodium


  (Protonix Tab)  40 mg  QAM


 PO  8/9/18 09:00


 9/7/18 08:59  8/9/18 08:55


40 MG


 


 Aspirin


  (Ecotrin Tab)  81 mg  BID


 PO  8/8/18 21:00


 9/7/18 20:59  8/9/18 08:55


81 MG


 


 Glucose


  (Glucose 40% Gel)  15-30


 GRAMS 15


 GRAMS...  UD  PRN


 PO  8/8/18 13:15


 9/7/18 13:14   


 


 


 Glucose


  (Glucose Chew


 Tab)  4-8


 Tablets 4


 Tabl...  UD  PRN


 PO  8/8/18 13:15


 9/7/18 13:14   


 


 


 Dextrose


  (Dextrose 50%


 50ML Syringe)  25-50ML


 25ML FOR


 ...  UD  PRN


 IV  8/8/18 13:15


 9/7/18 13:14   


 


 


 Glucagon


  (Glucagon Inj)  1 mg  UD  PRN


 IM  8/8/18 13:15


 9/7/18 13:14   


 


 


 Carbohydrates


  (Carbohydrates


 For Hypoglycemia)  15-30 GRAMS


 15 grams if


 BSG 54-69...  UD  PRN


 PO  8/8/18 13:15


 9/7/18 13:14

## 2018-08-09 NOTE — DISCHARGE INSTRUCTIONS
Discharge Instructions


Date of Service


Aug 9, 2018.





Admission


Reason for Admission:  Left Knee Osteoarthritis





Discharge


Discharge Diagnosis / Problem:  Left TKA





Discharge Goals


Goal(s):  Improve function





Activity Recommendations


Activity Limitations:  as noted below





.





Instructions / Follow-Up


Instructions / Follow-Up


ACTIVITY RECOMMENDATIONS:





SELF CARE INSTRUCTIONS AFTER TOTAL KNEE REPLACEMENT





A.  You may need to continue a physical therapy program after discharge from 

the hospital.  There are several options available to you. 


      Your doctor will assist you in selecting the best one for you.





   1.  An out-patient facility 2 to 3 times a week for therapy or home therapy.


   2.  Continue working on all exercises taught to you in the hospital.  Your


                 goals should be to increase bending of your knee to 90 degrees 

and


                 beyond and to fully straighten your knee.





B.  You may progress at your own pace from walking with a walker or crutches to 

a cane; then to no assistive devices.





C.   Make walking a part of your daily routine.  Be up as much as comfortable 

with rest periods throughout the day.  


      Rest with leg elevation is very important. 


      Use the ice wrap frequently for the first 3-4 weeks.





D.  There are no restrictions on activities.  You may ride in a car, shop, 

participate in household chores and all social activities.





E.  Wear the long elastic stockings (MARY hose) 20 hours a day for 2 weeks after 

surgery.  


     They can be removed several times a day for laundering and for a bath.





F.  You may shower, no tub baths until cleared by your doctor.








SPECIAL CARE INSTRUCTIONS:





**VERY IMPORTANT TO READ AND REVIEW**





A.  There are a few signs you need to watch for after you are home.  Call  

[facility] if you notice any of the followin.  Increased severe knee pain.  Some pain is expected especially  when you 

exercise.


   2.  Increased swelling in your leg or knee; pain or swelling of the calf 

muscle in either lower leg.


   3.  Any fluid drainage from the incision.


   4.  Shortness of breath or chest pain.





B.  Please call [facility] at [phone] if you have any  

concerns or questions about your operation or recovery.  


     The doctor or his nurse will return your call promptly.





C.  You must take antibiotics before dental work, bladder, bowel or other 

surgery.  


      Your doctor will provide you with a permanent care to carry describing 

this precaution.





IMPORTANT:





*  REMEMBER TO TAKE ASPIRIN, 81 MG, TWICE DAILY FOR 4 WEEKS UNLESS OTHERWISE 

DIRECTED.  


   THIS IS YOUR BLOOD THINNER.





*  HIGH RISK PATIENTS MAY BE PRESCRIBED A STRONGER BLOOD THINNER.  


   THIS WILL BE PROVIDED AT DISCHARGE.





*  CALL IF INCREASED PAIN, REDNESS, DRAINAGE OR FEVER GREATER THAT 101.





*  WEAR MARY HOSE 20 HOURS PER DAY FOR 2 WEEKS.





*  YOU MAY HAVE A LARGE BAND-AID LIKE DRESSING (SILVERON).  THIS WILL  REMAIN 

ON YOUR INCISION FOR 7 DAYS, THEN CAN BE REMOVED. 


    IF INCISION IS LEAKING THROUGH DRESSING, CALL THE OFFICE [phone].








FOLLOW UP VISIT:





If appointment is not already scheduled:





Please call [facility] to make a follow-up appointment for 


2 weeks after your surgery at [phone].





Current Hospital Diet


Patient's current hospital diet: Diabetes Type 2 Diet





Discharge Diet


Recommended Diet:  Diabetes Type 2 Diet





Procedures


Procedures Performed:  


Left Total Knee Arthroplasty,increased difficulty bmi46.2





Pending Studies


Studies pending at discharge:  no





Laboratory Results





Hemoglobin A1c








Test


  18


10:12 Range/Units


 


 


Estimated Average Glucose 200   mg/dl


 


Hemoglobin A1c 8.6 H 4.5-5.6  %











Medical Emergencies








.


Who to Call and When:





Medical Emergencies:  If at any time you feel your situation is an emergency, 

please call 911 immediately.





.





Non-Emergent Contact


Non-Emergency issues call your:  Primary Care Provider


.








"Provider Documentation" section prepared by Darrin Yen.








.





[place] Drug Monitoring Program


Search Results:  patient reviewed within database, no issues identified

## 2018-08-10 VITALS
DIASTOLIC BLOOD PRESSURE: 96 MMHG | TEMPERATURE: 98.06 F | OXYGEN SATURATION: 96 % | HEART RATE: 72 BPM | SYSTOLIC BLOOD PRESSURE: 157 MMHG

## 2018-08-10 VITALS — OXYGEN SATURATION: 96 %

## 2018-08-10 RX ADMIN — Medication SCH TAB: at 08:21

## 2018-08-10 RX ADMIN — OXYCODONE HYDROCHLORIDE PRN MG: 5 TABLET ORAL at 10:25

## 2018-08-10 RX ADMIN — DOCUSATE SODIUM SCH MG: 100 CAPSULE, LIQUID FILLED ORAL at 08:22

## 2018-08-10 RX ADMIN — Medication SCH MG: at 08:22

## 2018-08-10 RX ADMIN — ACETAMINOPHEN SCH MG: 500 TABLET, COATED ORAL at 05:24

## 2018-08-10 RX ADMIN — METFORMIN HYDROCHLORIDE SCH MG: 500 TABLET, FILM COATED ORAL at 08:21

## 2018-08-10 RX ADMIN — OXYCODONE HYDROCHLORIDE PRN MG: 5 TABLET ORAL at 00:52

## 2018-08-10 RX ADMIN — FERROUS GLUCONATE SCH MG: 324 TABLET ORAL at 08:20

## 2018-08-10 RX ADMIN — OXYCODONE HYDROCHLORIDE PRN MG: 5 TABLET ORAL at 05:24

## 2018-08-10 RX ADMIN — CELECOXIB SCH MG: 200 CAPSULE ORAL at 08:21

## 2018-08-10 RX ADMIN — PANTOPRAZOLE SCH MG: 40 TABLET, DELAYED RELEASE ORAL at 08:22

## 2018-08-10 RX ADMIN — INSULIN ASPART SCH UNITS: 100 INJECTION, SOLUTION INTRAVENOUS; SUBCUTANEOUS at 07:38

## 2018-08-10 NOTE — ORTHOPEDIC PROGRESS NOTE
Orthopedic Progress Note


Date of Service


Aug 10, 2018.





Subjective


Post OP Day:  2


Reports: feeling well, pain controlled w PO medications, Denies: complaints, 

chest pain, SOB, nausea / vomiting, light headedness, calf pain





Objective


calves soft nontender, N/V intact, capillary refill less than 2 sec., dressing C

/D/I, A&O x3, toes mobile


Silverlon in tact











  Date Time  Temp Pulse Resp B/P (MAP) Pulse Ox O2 Delivery O2 Flow Rate FiO2


 


8/10/18 07:33     96 Room Air  


 


8/10/18 06:35 36.7 72 16 157/96 (116) 96 Room Air  


 


8/9/18 22:46 36.8 74 16 134/78 (96) 97 Room Air  


 


8/9/18 19:40      Room Air  


 


8/9/18 15:33 36.7 57 16 146/80 (102) 97 Room Air  


 


8/9/18 11:10 37.0 61 16 130/76 (94) 95 Room Air  


 


8/9/18 10:56  57   98   











Assessment & Plan


Assessment:


POD #2, Left TKA


Plan:


PT/ OT


DVT proph- ASA


D/C planning- Home w OPPT today


As per medicine.








Inhouse Planning


Pain Management:  Celebrex, Toradol, Morphine, PO Tylenol, Oxy IR


DVT Prophylaxis:  TEDs, SCDs, ASA





Discharge Planning


Discharge Planning:  home with oppt


Pain Management:  Celebrex, PO Tylenol, Oxy IR


DVT Prophylaxis:  TEDs, ASA


Therapy:  Physical Therapy, Occupational Therapy

## 2018-08-14 NOTE — DISCHARGE SUMMARY
DISCHARGE DIAGNOSIS:  Degenerative joint disease, left knee.

 

SECONDARY DIAGNOSES:  Sleep apnea, diabetes mellitus, osteoarthritis,

sciatica, gastroesophageal reflux disease, obesity.

 

CONSULTS:  Kerri Reyes PA-C/Mendoza Saleh MD

 

COMPLICATIONS:  None.

 

PROCEDURES:  Left total knee arthroplasty performed by Dr. Bethea on

08/08/2018.

 

BRIEF HISTORY:  As dictated in the history and physical.

 

HOSPITAL SUMMARY:  The patient was admitted on the above date and had the

above-noted surgery performed, which he tolerated well.  On the first

postoperative day, the patient was feeling well and pain was controlled and

there were no complaints.  Calves were soft and nontender.  Neurovascularly

intact.  Dressings clean, dry, and intact.  Toes were mobile.  Vital signs

are stable.  He is afebrile.  Hemoglobin is 12.6.  The patient was started on

physical therapy protocol and continued on DVT prophylaxis and pain

management.  By his second postoperative day, the patient was feeling well

and pain was controlled and he had no complaints.  Calves were soft and

nontender.  Neurovascularly intact.  Capillary refill is less than 2 seconds.

 Dressings clean, dry, and intact.  Toes were mobile.  Silverlon dressing was

intact.  Vital signs stable.  He was afebrile.  He is progressing well with

his physical therapy and remaining stable and medically and orthopedically

and it was felt he could be discharged to home on 08/10/2018.  For further

review, please see chart.

 

LAB AND X-RAY DATA:  As per chart.

 

DISCHARGE INSTRUCTIONS:  The patient was discharged to home in satisfactory

condition on 08/10/2018.  Diet:  Diabetic.  Activity:  Follow TK instruction

sheets and special care instructions as noted.  Follow up with Dr. Bethea in

2 weeks.  The patient to call for appointment if one has not been made for

you.

 

DISCHARGE MEDICATIONS:  Acetaminophen 1000 mg p.o. q. 8 hours for 21 days,

aspirin 81 mg p.o. b.i.d. for 30 days, Celebrex 200 mg p.o. b.i.d. for 30

days, oxycodone 5-10 mg p.o. q. 4 hours p.r.n. Resume home meds as listed. 

Stop taking your original acetaminophen dosage.  Stop taking previous

oxycodone dose and stop taking diclofenac.

## 2018-08-21 ENCOUNTER — HOSPITAL ENCOUNTER (OUTPATIENT)
Dept: HOSPITAL 45 - C.ULTRBC | Age: 48
Discharge: HOME | End: 2018-08-21
Attending: ORTHOPAEDIC SURGERY
Payer: COMMERCIAL

## 2018-08-21 DIAGNOSIS — R60.0: Primary | ICD-10-CM

## 2018-08-21 NOTE — DIAGNOSTIC IMAGING REPORT
L VENOUS DOPP LOWER EXT UNILAT



CLINICAL HISTORY: 47 years-old Male presenting with LEFT LEG PAIN. 



TECHNIQUE: Real-time grayscale and color and spectral Doppler ultrasound imaging

of the veins of the left lower extremity was performed. Compression and

augmentation were also utilized.



COMPARISON: None.



FINDINGS: 



LEFT:

Common femoral vein: Patent.

Greater saphenous vein: Patent.

Deep femoral vein: Patent.

Femoral vein: Patent.

Popliteal vein: Patent.

Calf veins: Patent.



Other: Irregular hypoechoic to anechoic collection in the popliteal fossa

measuring 4.6 x 2.7 x 0.7 cm. This is avascular on color Doppler. This is most

suggestive of a complex popliteal cyst. Subcutaneous edema also noted in the

left popliteal fossa.



IMPRESSION:

1.  No evidence of deep venous thrombosis.



2.  Findings suggest complex popliteal cyst. The presence of synovitis or recent

rupture cannot be excluded.







Electronically signed by:  Wilmar Cotton M.D.

8/21/2018 10:57 AM



Dictated Date/Time:  8/21/2018 10:56 AM